# Patient Record
Sex: MALE | Race: BLACK OR AFRICAN AMERICAN | Employment: UNEMPLOYED | RURAL
[De-identification: names, ages, dates, MRNs, and addresses within clinical notes are randomized per-mention and may not be internally consistent; named-entity substitution may affect disease eponyms.]

---
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
## 2021-05-07 ENCOUNTER — OFFICE VISIT (OUTPATIENT)
Dept: FAMILY MEDICINE CLINIC | Age: 21
End: 2021-05-07

## 2021-05-07 VITALS
SYSTOLIC BLOOD PRESSURE: 115 MMHG | RESPIRATION RATE: 20 BRPM | OXYGEN SATURATION: 100 % | HEART RATE: 78 BPM | TEMPERATURE: 98.7 F | WEIGHT: 131.2 LBS | HEIGHT: 69 IN | DIASTOLIC BLOOD PRESSURE: 70 MMHG | BODY MASS INDEX: 19.43 KG/M2

## 2021-05-07 DIAGNOSIS — Z02.1 PRE-EMPLOYMENT HEALTH SCREENING EXAMINATION: Primary | ICD-10-CM

## 2021-05-07 DIAGNOSIS — R31.21 ASYMPTOMATIC MICROSCOPIC HEMATURIA: ICD-10-CM

## 2021-05-07 NOTE — PROGRESS NOTES
Chief Complaint   Patient presents with    Employment Physical     DOT       3 most recent PHQ Screens 5/7/2021   Little interest or pleasure in doing things Not at all   Feeling down, depressed, irritable, or hopeless Not at all   Total Score PHQ 2 0   In the past year have you felt depressed or sad most days, even if you felt okay? -   Has there been a time in the past month when you have had serious thoughts about ending your life? -   Have you ever in your whole life, tried to kill yourself or made a suicide attempt? -     Learning Assessment 5/7/2021   PRIMARY LEARNER Patient   HIGHEST LEVEL OF EDUCATION - PRIMARY LEARNER  -   BARRIERS PRIMARY LEARNER -   Alexa 149 -   ANSWERED BY patient   RELATIONSHIP SELF     No flowsheet data found. Abuse Screening Questionnaire 5/7/2021   Do you ever feel afraid of your partner? N   Are you in a relationship with someone who physically or mentally threatens you? N   Is it safe for you to go home? Y     ADL Assessment 5/7/2021   Feeding yourself No Help Needed   Getting from bed to chair No Help Needed   Getting dressed No Help Needed   Bathing or showering No Help Needed   Walk across the room (includes cane/walker) No Help Needed   Using the telphone No Help Needed   Taking your medications No Help Needed   Preparing meals No Help Needed   Managing money (expenses/bills) No Help Needed   Moderately strenuous housework (laundry) No Help Needed   Shopping for personal items (toiletries/medicines) No Help Needed   Shopping for groceries No Help Needed   Driving No Help Needed   Climbing a flight of stairs No Help Needed   Getting to places beyond walking distances No Help Needed     1.  Have you been to the ER, urgent care clinic since your last visit? Hospitalized since your last visit? No    2. Have you seen or consulted any other health care providers outside of the [address] [place] [address] since your last visit? Include any pap smears or colon screening. No      Chief Complaint   Patient presents with    Employment Physical     DOT         Visit Vitals  /70 (BP 1 Location: Left arm, BP Patient Position: Sitting, BP Cuff Size: Adult long)   Pulse 78   Temp 98.7 °F (37.1 °C) (Temporal)   Resp 20   Ht 5' 9\" (1.753 m)   Wt 131 lb 3.2 oz (59.5 kg)   SpO2 100%   BMI 19.37 kg/m²       Pain Scale: 0 - No pain/10  Pain Location:    Visual Acuity Screening    Right eye Left eye Both eyes   Without correction: 20/20 20/20 20/20   With correction:      Comments: Red is red and green is green. Archie Clifford is a 24 y.o. male presenting for/with:    No chief complaint on file. Symptom review:    NO  Fever   NO  Shaking chills  NO  Cough  NO  Body aches  NO  Coughing up blood  NO  Chest congestion  NO  Chest pain  NO  Shortness of breath  NO  Profound Loss of smell/taste  NO  Nausea/Vomiting   NO  Loose stool/Diarrhea  NO  any skin issues    Patient Risk Factors Reviewed as follows:  NO  have you been in Close contact with confirmed COVID19 patient   NO  History of recent travel to affected geographical areas within the past 14 days  NO  COPD  NO  Active Cancer/Leukemia/Lymphoma/Chemotherapy  NO  Oral steroid use  NO  Pregnant  NO  Diabetes Mellitus  NO  Heart disease  NO  Asthma  NO Health care worker at home  NO Health care worker  NO Is there a Pregnant Woman in the home  NO Dialysis pt in the home   NO a large number of people living in the home      This encounter was created in error - please disregard.
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
## 2021-05-07 NOTE — PROGRESS NOTES
POC Urine    Clear  Yellow  Glucose - negative  Bilirubin - negative  Ketones- negative  SG- 1.010  Blood- Small  PH- 7.0  Pro- Negative  Uro- 0.2  Nit- Negative  Roxy- Negative

## 2021-05-23 ENCOUNTER — APPOINTMENT (OUTPATIENT)
Dept: GENERAL RADIOLOGY | Age: 21
DRG: 754 | End: 2021-05-23
Attending: FAMILY MEDICINE
Payer: MEDICAID

## 2021-05-23 ENCOUNTER — HOSPITAL ENCOUNTER (INPATIENT)
Age: 21
LOS: 2 days | Discharge: HOME | DRG: 754 | End: 2021-05-25
Attending: FAMILY MEDICINE | Admitting: PSYCHIATRY & NEUROLOGY
Payer: MEDICAID

## 2021-05-23 DIAGNOSIS — R45.851 DEPRESSION WITH SUICIDAL IDEATION: Primary | ICD-10-CM

## 2021-05-23 DIAGNOSIS — F32.A DEPRESSION WITH SUICIDAL IDEATION: Primary | ICD-10-CM

## 2021-05-23 DIAGNOSIS — R07.89 CHEST WALL PAIN: ICD-10-CM

## 2021-05-23 PROBLEM — F32.9 MAJOR DEPRESSION: Status: RESOLVED | Noted: 2021-05-23 | Resolved: 2021-05-23

## 2021-05-23 PROBLEM — F32.2 MAJOR DEPRESSIVE DISORDER, SINGLE EPISODE, SEVERE (HCC): Status: ACTIVE | Noted: 2021-05-23

## 2021-05-23 PROBLEM — F32.9 MAJOR DEPRESSION: Status: ACTIVE | Noted: 2021-05-23

## 2021-05-23 PROBLEM — F32.9 MAJOR DEPRESSIVE EPISODE: Status: ACTIVE | Noted: 2021-05-23

## 2021-05-23 LAB
ALBUMIN SERPL-MCNC: 4.3 G/DL (ref 3.5–5)
ALBUMIN/GLOB SERPL: 1.2 {RATIO} (ref 1.1–2.2)
ALP SERPL-CCNC: 61 U/L (ref 45–117)
ALT SERPL-CCNC: 19 U/L (ref 12–78)
AMPHET UR QL SCN: NEGATIVE
ANION GAP SERPL CALC-SCNC: 16 MMOL/L (ref 5–15)
APAP SERPL-MCNC: <2 UG/ML (ref 10–30)
APPEARANCE UR: CLEAR
AST SERPL-CCNC: 28 U/L (ref 15–37)
BACTERIA URNS QL MICRO: NEGATIVE /HPF
BARBITURATES UR QL SCN: NEGATIVE
BASOPHILS # BLD: 0 K/UL (ref 0–0.1)
BASOPHILS NFR BLD: 1 % (ref 0–1)
BENZODIAZ UR QL: NEGATIVE
BILIRUB SERPL-MCNC: 0.4 MG/DL (ref 0.2–1)
BILIRUB UR QL: NEGATIVE
BUN SERPL-MCNC: 13 MG/DL (ref 6–20)
BUN/CREAT SERPL: 12 (ref 12–20)
CALCIUM SERPL-MCNC: 9.7 MG/DL (ref 8.5–10.1)
CANNABINOIDS UR QL SCN: POSITIVE
CHLORIDE SERPL-SCNC: 105 MMOL/L (ref 97–108)
CK MB CFR SERPL CALC: 0.2 % (ref 0–2.5)
CK MB SERPL-MCNC: 3.1 NG/ML (ref 5–25)
CK SERPL-CCNC: 1585 U/L (ref 39–308)
CK SERPL-CCNC: 1628 U/L (ref 39–308)
CO2 SERPL-SCNC: 24 MMOL/L (ref 21–32)
COCAINE UR QL SCN: NEGATIVE
COLOR UR: ABNORMAL
CREAT SERPL-MCNC: 1.1 MG/DL (ref 0.7–1.3)
DIFFERENTIAL METHOD BLD: NORMAL
DRUG SCRN COMMENT,DRGCM: ABNORMAL
EOSINOPHIL # BLD: 0 K/UL (ref 0–0.4)
EOSINOPHIL NFR BLD: 0 % (ref 0–7)
EPITH CASTS URNS QL MICRO: NORMAL /LPF
ERYTHROCYTE [DISTWIDTH] IN BLOOD BY AUTOMATED COUNT: 13 % (ref 11.5–14.5)
ETHANOL SERPL-MCNC: 40 MG/DL
ETHANOL SERPL-MCNC: 71 MG/DL
FLUAV RNA SPEC QL NAA+PROBE: NOT DETECTED
FLUBV RNA SPEC QL NAA+PROBE: NOT DETECTED
GLOBULIN SER CALC-MCNC: 3.6 G/DL (ref 2–4)
GLUCOSE SERPL-MCNC: 93 MG/DL (ref 65–100)
GLUCOSE UR STRIP.AUTO-MCNC: NEGATIVE MG/DL
HCT VFR BLD AUTO: 41.1 % (ref 36.6–50.3)
HGB BLD-MCNC: 14.1 G/DL (ref 12.1–17)
HGB UR QL STRIP: ABNORMAL
IMM GRANULOCYTES # BLD AUTO: 0 K/UL (ref 0–0.04)
IMM GRANULOCYTES NFR BLD AUTO: 0 % (ref 0–0.5)
KETONES UR QL STRIP.AUTO: NEGATIVE MG/DL
LEUKOCYTE ESTERASE UR QL STRIP.AUTO: NEGATIVE
LYMPHOCYTES # BLD: 2.5 K/UL (ref 0.8–3.5)
LYMPHOCYTES NFR BLD: 37 % (ref 12–49)
MAGNESIUM SERPL-MCNC: 1.9 MG/DL (ref 1.6–2.4)
MCH RBC QN AUTO: 29.7 PG (ref 26–34)
MCHC RBC AUTO-ENTMCNC: 34.3 G/DL (ref 30–36.5)
MCV RBC AUTO: 86.5 FL (ref 80–99)
METHADONE UR QL: NEGATIVE
MONOCYTES # BLD: 0.3 K/UL (ref 0–1)
MONOCYTES NFR BLD: 5 % (ref 5–13)
MUCOUS THREADS URNS QL MICRO: NORMAL /LPF
NEUTS SEG # BLD: 3.9 K/UL (ref 1.8–8)
NEUTS SEG NFR BLD: 57 % (ref 32–75)
NITRITE UR QL STRIP.AUTO: NEGATIVE
NRBC # BLD: 0 K/UL (ref 0–0.01)
NRBC BLD-RTO: 0 PER 100 WBC
OPIATES UR QL: NEGATIVE
PCP UR QL: NEGATIVE
PH UR STRIP: 6 [PH] (ref 5–8)
PLATELET # BLD AUTO: 218 K/UL (ref 150–400)
PMV BLD AUTO: 10.5 FL (ref 8.9–12.9)
POTASSIUM SERPL-SCNC: 3.3 MMOL/L (ref 3.5–5.1)
PROT SERPL-MCNC: 7.9 G/DL (ref 6.4–8.2)
PROT UR STRIP-MCNC: NEGATIVE MG/DL
RBC # BLD AUTO: 4.75 M/UL (ref 4.1–5.7)
RBC #/AREA URNS HPF: NORMAL /HPF (ref 0–5)
SALICYLATES SERPL-MCNC: <1.7 MG/DL (ref 2.8–20)
SARS-COV-2, COV2: NOT DETECTED
SODIUM SERPL-SCNC: 145 MMOL/L (ref 136–145)
SP GR UR REFRACTOMETRY: 1.02 (ref 1–1.03)
TROPONIN I SERPL-MCNC: <0.05 NG/ML
TROPONIN I SERPL-MCNC: <0.05 NG/ML
TSH SERPL DL<=0.05 MIU/L-ACNC: 1.26 UIU/ML (ref 0.36–3.74)
UROBILINOGEN UR QL STRIP.AUTO: 0.2 EU/DL (ref 0.2–1)
WBC # BLD AUTO: 6.8 K/UL (ref 4.1–11.1)
WBC URNS QL MICRO: NORMAL /HPF (ref 0–4)

## 2021-05-23 PROCEDURE — 74011250637 HC RX REV CODE- 250/637: Performed by: PSYCHIATRY & NEUROLOGY

## 2021-05-23 PROCEDURE — 96361 HYDRATE IV INFUSION ADD-ON: CPT

## 2021-05-23 PROCEDURE — 84484 ASSAY OF TROPONIN QUANT: CPT

## 2021-05-23 PROCEDURE — 82553 CREATINE MB FRACTION: CPT

## 2021-05-23 PROCEDURE — 65220000003 HC RM SEMIPRIVATE PSYCH

## 2021-05-23 PROCEDURE — 82550 ASSAY OF CK (CPK): CPT

## 2021-05-23 PROCEDURE — 80179 DRUG ASSAY SALICYLATE: CPT

## 2021-05-23 PROCEDURE — 80053 COMPREHEN METABOLIC PANEL: CPT

## 2021-05-23 PROCEDURE — 93005 ELECTROCARDIOGRAM TRACING: CPT

## 2021-05-23 PROCEDURE — 36415 COLL VENOUS BLD VENIPUNCTURE: CPT

## 2021-05-23 PROCEDURE — 82077 ASSAY SPEC XCP UR&BREATH IA: CPT

## 2021-05-23 PROCEDURE — 81001 URINALYSIS AUTO W/SCOPE: CPT

## 2021-05-23 PROCEDURE — 84443 ASSAY THYROID STIM HORMONE: CPT

## 2021-05-23 PROCEDURE — 99285 EMERGENCY DEPT VISIT HI MDM: CPT

## 2021-05-23 PROCEDURE — 74011250636 HC RX REV CODE- 250/636: Performed by: EMERGENCY MEDICINE

## 2021-05-23 PROCEDURE — 83735 ASSAY OF MAGNESIUM: CPT

## 2021-05-23 PROCEDURE — 80143 DRUG ASSAY ACETAMINOPHEN: CPT

## 2021-05-23 PROCEDURE — 87636 SARSCOV2 & INF A&B AMP PRB: CPT

## 2021-05-23 PROCEDURE — 85025 COMPLETE CBC W/AUTO DIFF WBC: CPT

## 2021-05-23 PROCEDURE — 71045 X-RAY EXAM CHEST 1 VIEW: CPT

## 2021-05-23 PROCEDURE — 80307 DRUG TEST PRSMV CHEM ANLYZR: CPT

## 2021-05-23 PROCEDURE — 96374 THER/PROPH/DIAG INJ IV PUSH: CPT

## 2021-05-23 RX ORDER — MAG HYDROX/ALUMINUM HYD/SIMETH 200-200-20
30 SUSPENSION, ORAL (FINAL DOSE FORM) ORAL
Status: DISCONTINUED | OUTPATIENT
Start: 2021-05-23 | End: 2021-05-25 | Stop reason: HOSPADM

## 2021-05-23 RX ORDER — LORAZEPAM 1 MG/1
1 TABLET ORAL
Status: DISCONTINUED | OUTPATIENT
Start: 2021-05-23 | End: 2021-05-24

## 2021-05-23 RX ORDER — SODIUM CHLORIDE 0.9 % (FLUSH) 0.9 %
5-10 SYRINGE (ML) INJECTION ONCE
Status: DISCONTINUED | OUTPATIENT
Start: 2021-05-23 | End: 2021-05-23

## 2021-05-23 RX ORDER — LORAZEPAM 2 MG/ML
2 INJECTION INTRAMUSCULAR
Status: DISCONTINUED | OUTPATIENT
Start: 2021-05-23 | End: 2021-05-24

## 2021-05-23 RX ORDER — HYDROXYZINE PAMOATE 50 MG/1
50 CAPSULE ORAL
Status: DISCONTINUED | OUTPATIENT
Start: 2021-05-23 | End: 2021-05-25 | Stop reason: HOSPADM

## 2021-05-23 RX ORDER — KETOROLAC TROMETHAMINE 30 MG/ML
15 INJECTION, SOLUTION INTRAMUSCULAR; INTRAVENOUS
Status: COMPLETED | OUTPATIENT
Start: 2021-05-23 | End: 2021-05-23

## 2021-05-23 RX ORDER — TRAZODONE HYDROCHLORIDE 50 MG/1
50 TABLET ORAL
Status: DISCONTINUED | OUTPATIENT
Start: 2021-05-23 | End: 2021-05-24

## 2021-05-23 RX ORDER — ACETAMINOPHEN 325 MG/1
650 TABLET ORAL
Status: DISCONTINUED | OUTPATIENT
Start: 2021-05-23 | End: 2021-05-25 | Stop reason: HOSPADM

## 2021-05-23 RX ORDER — IBUPROFEN 600 MG/1
600 TABLET ORAL
Status: DISCONTINUED | OUTPATIENT
Start: 2021-05-23 | End: 2021-05-25 | Stop reason: HOSPADM

## 2021-05-23 RX ORDER — ADHESIVE BANDAGE
30 BANDAGE TOPICAL DAILY PRN
Status: DISCONTINUED | OUTPATIENT
Start: 2021-05-23 | End: 2021-05-25 | Stop reason: HOSPADM

## 2021-05-23 RX ADMIN — HYDROXYZINE PAMOATE 50 MG: 50 CAPSULE ORAL at 14:10

## 2021-05-23 RX ADMIN — IBUPROFEN 600 MG: 600 TABLET ORAL at 17:41

## 2021-05-23 RX ADMIN — KETOROLAC TROMETHAMINE 15 MG: 30 INJECTION, SOLUTION INTRAMUSCULAR at 08:03

## 2021-05-23 RX ADMIN — SODIUM CHLORIDE 1000 ML: 9 INJECTION, SOLUTION INTRAVENOUS at 09:19

## 2021-05-23 RX ADMIN — TRAZODONE HYDROCHLORIDE 50 MG: 50 TABLET ORAL at 21:14

## 2021-05-23 NOTE — ED NOTES
[facility] from [facility] was called for a consult. Computer was was placed at bedside.

## 2021-05-23 NOTE — ED NOTES
Per [place] at [facility], Dr. Willard Arellano has not accepted pt and asked that pt be transferred to higher level of care.  Dr. Vielka Jon speaking with Dr. Willard Arellano at  This time
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
## 2021-05-23 NOTE — BH NOTES
Report received from Nia Diaz RN re:  ANABELLAAR at [id number] . Admitted via  to unit as voluntary pt, accompanied by security at 1325 . Alert and oriented x 3. Affect tearful; Mood depressed/anxious. Smoking and alcohol  danger situations, triggers, coping mechanisms, resources discussed. Does  not hold a professional license in [address] [facility] [address]. Raul Vences notified of arrival, along with RNS. Treatment will focus on jose carlos for safety, medication and group activities to better manage anxiety/depression. Placed on q 15 min safety checks and fall prevention. Tired; cooperative. Broke into tears when father called. Has had a falling out with his mother since a family member wasn't allowed to attend his graduation. \"I don't think I'll have a place to go when I leave here\" (lives with mother). Ate all of meal and took a nap.     PRN Medication Documentation    Specific patient behavior that led to need for PRN medication:anxiety mild tearful  Staff interventions attempted prior to PRN being given: talk with pt, reassure  PRN medication given: Vistaril 50mg po given at 1410  Patient response/effectiveness of PRN medication: sleeping one hour later with reg resp    PRN Medication Documentation    Specific patient behavior that led to need for PRN medication:Chest pain mid anterior 7/10,   Staff interventions attempted prior to PRN being given: assess pt, no sob, nausea, hurts more with deep breathing, lung sounds clear  PRN medication given: Motrin 600mg po given at 1741  Patient response/effectiveness of PRN medication: pain 3/10 at 1828; resting

## 2021-05-23 NOTE — ED TRIAGE NOTES
Patient brought in by EMS alcohol intoxication, patient is emotional expressing family issues. Pain denies pain. Patient states his emotional state is due to the fact that his mother did not come to his graduation.

## 2021-05-23 NOTE — MASTER TREATMENT PLAN
[address] [facility] 60 Master Treatment Plan for Marimar Litter Date Treatment Plan Initiated:05/23/21 Treatment Plan Modalities: 
Type of Modality Amount (x minutes) Frequency (x/week) Duration (x days) Name of Responsible Staff Community & wrap-up meetings to encourage peer interactions 30 14 7 Ranjit FLETCHER, CNA/MHT Gibson Vallejo., MHT Group psychotherapy to assist in building coping skills and internal controls [id number] Linda Sanchez, [facility] Regino Espinoza., [facility] Therapeutic activity groups to build coping skills 60 7 7 Abram Amaya., MHT Psychoeducation in group setting to address:  
Medication education Coping Skills Symptom Management [id number] Linda Sanchez, [facility] Regino Espinoza., [facility] Janiya Diane., RN Alfredo Ontiveros RN Discharge planning [id number] [place] [address] Spirituality 60 2 1 Isabelle Guadarrama.  
Physician medication management 15 7 7 MD JOHNY Sheppard. Luzma Lynn MD  
      
      
 
      
 
      
 
 
Treatment Team Signatures I have participated in the development of this plan of treatment and agree to its implementation. Patient Signature Patient Printed Name Date/Time Social Work/Therapist Signature Social Work/Therapist Printed Name Date/Time RN Signature RN Printed Name Kimberly Ojeda RN Date/Time 05/23/21 Other Signature Other Signature Date/Time MD Signature MD Printed Name Date/Time
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
## 2021-05-23 NOTE — ED NOTES
Writer was drawing blood for repeat ETOH, patient requested that he does not have to go home, \"I don't want to be there please can I stay here, they broke me, they hurt my feelings\" Physician ask patient if he wanted to kill himself, patient answers yes, I don't want to go home\". Patient was moved to bed one and a safe room was prepared.

## 2021-05-24 PROBLEM — F43.21 ADJUSTMENT DISORDER WITH DEPRESSED MOOD: Status: ACTIVE | Noted: 2021-05-24

## 2021-05-24 PROBLEM — F32.2 MAJOR DEPRESSIVE DISORDER, SINGLE EPISODE, SEVERE (HCC): Status: RESOLVED | Noted: 2021-05-23 | Resolved: 2021-05-24

## 2021-05-24 LAB
CHOLEST SERPL-MCNC: 124 MG/DL
COMMENT, HOLDF: NORMAL
HDLC SERPL-MCNC: 61 MG/DL
HDLC SERPL: 2 {RATIO} (ref 0–5)
LDLC SERPL CALC-MCNC: 54.4 MG/DL (ref 0–100)
SAMPLES BEING HELD,HOLD: NORMAL
TRIGL SERPL-MCNC: 43 MG/DL (ref ?–150)
VLDLC SERPL CALC-MCNC: 8.6 MG/DL

## 2021-05-24 PROCEDURE — 74011250637 HC RX REV CODE- 250/637: Performed by: PSYCHIATRY & NEUROLOGY

## 2021-05-24 PROCEDURE — 36415 COLL VENOUS BLD VENIPUNCTURE: CPT

## 2021-05-24 PROCEDURE — 80061 LIPID PANEL: CPT

## 2021-05-24 PROCEDURE — 90791 PSYCH DIAGNOSTIC EVALUATION: CPT | Performed by: PSYCHIATRY & NEUROLOGY

## 2021-05-24 PROCEDURE — 65220000003 HC RM SEMIPRIVATE PSYCH

## 2021-05-24 RX ORDER — ALBUTEROL SULFATE 0.83 MG/ML
2.5 SOLUTION RESPIRATORY (INHALATION)
Status: DISCONTINUED | OUTPATIENT
Start: 2021-05-24 | End: 2021-05-25 | Stop reason: HOSPADM

## 2021-05-24 RX ORDER — TRAZODONE HYDROCHLORIDE 50 MG/1
25 TABLET ORAL
Status: DISCONTINUED | OUTPATIENT
Start: 2021-05-24 | End: 2021-05-25 | Stop reason: HOSPADM

## 2021-05-24 RX ADMIN — TRAZODONE HYDROCHLORIDE 25 MG: 50 TABLET ORAL at 21:10

## 2021-05-24 RX ADMIN — HYDROXYZINE PAMOATE 50 MG: 50 CAPSULE ORAL at 15:50

## 2021-05-24 RX ADMIN — MAGNESIUM HYDROXIDE/ALUMINUM HYDROXICE/SIMETHICONE 30 ML: 120; 1200; 1200 SUSPENSION ORAL at 11:29

## 2021-05-24 NOTE — GROUP NOTE
DOMINIQUE  GROUP DOCUMENTATION INDIVIDUAL Group Therapy Note Date: 5/24/2021 Group Start Time: 1000 Group End Time: 3184 Group Topic: [employer] [address] 
 
[facility] GROUP DOCUMENTATION GROUP Group Therapy Note Attendees: 3 Attendance: Attended Patient's Goal:  Go home Interventions/techniques: Supported Follows Directions: Followed directions Interactions: Interacted appropriately Mental Status: Anxious, Calm and Preoccupied Behavior/appearance: Cooperative and Motivated Goals Achieved: Able to engage in interactions, Able to listen to others and Able to give feedback to another Additional Notes:  Addis Mas was out for the Group meeting today. He said that he is having a good day,ate good, and slept good last night. He is having some anxiety at the level of a 7 but no depression. He is excited that the doctor said he could go home tomorrow but seems a little preoccupied. He has no thoughts of hurting himself nor anyone else and is in no pain at this time. Sai Figueroa CNA

## 2021-05-24 NOTE — BH NOTES
Affect blunted, mood depressed. Attended and participated in group. Ate snacks. Interacting appropriately with staff and peers. Denied SI/HI/AVH/pain. Played Bingo with staff and peers. Medication compliant. Pleasant and co-operative. PRN Medication Documentation    Specific patient behavior that led to need for PRN medication: C/o insomnia. Staff interventions attempted prior to PRN being given: Low stimulation prior to bedtime. PRN medication given: Trazodone 50 mg PO at 2114. Patient response/effectiveness of PRN medication: Currently resting in bed. Trazodone was effective. Pt rested quietly in bed with eyes closed for 8.25 hours. Respirations even and unlabored. Pt in no apparent distress.

## 2021-05-24 NOTE — GROUP NOTE
[facility] GROUP DOCUMENTATION INDIVIDUAL Group Therapy Note Date: 5/24/2021 Group Start Time: 9872 Group End Time: 6372 Group Topic: Nursing [address]  GROUP DOCUMENTATION GROUP Group Therapy Note Attendees: 6 Attendance: Attended Patient's Goal:  Patient will be compliant to medication regimen daily. Interventions/techniques: Provide feedback and Reinforced Follows Directions: Followed directions Interactions: Interacted appropriately Mental Status: Blunted and Calm Behavior/appearance: Attentive, Cooperative and Needed prompting Goals Achieved: Able to engage in interactions, Able to listen to others, Able to give feedback to another, Able to receive feedback and Identified medication adherence strategies Additional Notes:  Patient attended and engaged in today's Nursing Medication Education Group with prompting. Patient provided handouts.  
 
Wai Loera RN

## 2021-05-24 NOTE — ROUTINE PROCESS
Shift change report given to Salinas Anthony RN, re: SBAR, medications, and behavior. Jimbo Fall Risk Score - 1.
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
## 2021-05-24 NOTE — GROUP NOTE
DOMINIQUE  GROUP DOCUMENTATION INDIVIDUAL Group Therapy Note Date: 5/24/2021 Group Start Time: 1300 Group End Time: 3939 Group Topic: Process Group - Inpatient [address] OP Macy [facility] GROUP DOCUMENTATION GROUP Group Therapy Note Focus of session was on positive self talk when feeling anger. We spoke about how we can develop the skill of helping ourselves out of moments of anger that have a tendency to then impact your day and your functioning and well being. We spoke about the usual self talk that goes with anger and how it can make things worse and asked group members to share their anger thoughts that are typical for them. I shared some ideas for self talk of managing anger such as I don't need to prove myself in this situation or as long as I keep my cool, I am in control or people are going to act the way they want to, not the way I want them to.   We spoke about how these ideas can better sooth our anger and help us refocus on healthy self care. Attendance: Attended Patient's Goal 
  
Patient will identify 2 feelings or symptoms that might indicate a crisis is beginning to develop :   
 
Interventions/techniques: Informed, Validated, Promoted peer support, Provide feedback, Reinforced and Supported Follows Directions: Followed directions Interactions: Interacted appropriately Mental Status: Anxious, Calm and Congruent Behavior/appearance: Attentive, Cooperative and Needed prompting Goals Achieved: Able to engage in interactions, Able to listen to others, Able to reflect/comment on own behavior, Able to receive feedback, Able to self-disclose, Discussed coping, Discussed discharge plans, Discussed self-esteem issues, Discussed safety plan, Identified feelings, Identified triggers, Identified relapse prevention strategies, Identified medication adherence strategies and Identified resources and support systems Additional Notes:  Pt participated in the group activity. This is the pt's first hospitalization. He is still trying to sort out his feelings of confusion regarding his behavior and subsequent hospitalization Saturday night. He stated this is not his normal behavior. He knows the substance use contributed but he also does not like aggressive behavior and is embarrassed by his on this occasion. We discussed coping strategies and follow up counseling for issues related to his family [employer]

## 2021-05-24 NOTE — PROGRESS NOTES
Problem: Depressed Mood (Adult/Pediatric)    Goal: *STG: Attends activities and groups    Affect blunted, mood depressed. Attended and participated in group. Denied SI or intent to harm self. Assessed/monitored potential harm to self. Encouraged sharing of feelings. Monitored medication compliance and effectiveness. Offered encouragement, reassurance, and support.     Outcome: Progressing Towards Goal

## 2021-05-24 NOTE — BH NOTES
Affect blunted/restricted, mood depressed/anxious. Patient alert & oriented x 4. Patient speech clear and coherent. Patient answers questions appropriately. Patient denies SI/HI/AVH/pain. No delusional statements made. Patient is compliant with medications. Patient appetite good eats 100% of all meals plus snacks. Patient went outside with staff and peers for fresh air break. Patient attended and engaged in today's groups with prompting. Patient is wearing his face mask and adhering to social distancing protocol. Patient in no apparent distress all vital signs within normal limits. PRN Medication Documentation    Specific patient behavior that led to need for PRN medication: Patient c/o having indigestion. Staff interventions attempted prior to PRN being given: Assessment done. PRN medication given: Mylanta 30 mL po @ 1129 for indigestion. Patient response/effectiveness of PRN medication: Positive effects. PRN Medication Documentation    Specific patient behavior that led to need for PRN medication: Patient said that he felt anxious after talking to his family on the phone and requested PRN Vistaril for anxiety. Staff interventions attempted prior to PRN being given: Assessment done. PRN medication given: Vistaril 50 mg po given @ 1550 for anxiety. Patient response/effectiveness of PRN medication: Positive effects.

## 2021-05-24 NOTE — GROUP NOTE
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 Interventions/techniques: Supported Follows Directions: Followed directions Interactions: Interacted appropriately Mental Status: Calm Behavior/appearance: Motivated Goals Achieved: Able to engage in interactions Additional Notes:  He has depression at a 5 because he feels like he is messing everything up. He has no anxiety or pain at this time. Azra Gray CNA

## 2021-05-24 NOTE — BH NOTES
Shift change report given to [facility] [place] GUTIERREZ re: SBAR, medications, and behavior.   Jimbo fall risk score: (1)

## 2021-05-24 NOTE — ROUTINE PROCESS
Shift change report given to Stephy Raman Rn, re: SBAR. Medications, and behavior. Jimbo Fall Risk Fall (1)

## 2021-05-24 NOTE — PROGRESS NOTES
Problem: Risk of Harm to Self or Others  Goal: STG: Patient will limit number of statements of suicidal ideation or intent per day  Description: Patient will limit number of statements of suicidal ideation or intent per day. Indicate number of statements/day. Outcome: Resolved/Met  Goal: *STG: Patient will express feelings of depression, suicide, or self-harm without acting out  Description: Patient will express feelings of depression, suicide, or self-harm without acting out. Comment on how this will be achieved. Outcome: Resolved/Met  Goal: *STG: Patient will express feelings of anger, assaultiveness or homicide without acting out  Description: Patient will express feelings of anger, assaultiveness or homicide without acting out. Comment on how this will be achieved.   Outcome: Resolved/Met  Goal: *STG: Patient will identify 2 alternative ways to cope with suicidal or self-harm feelings  Outcome: Resolved/Met  Goal: *STG: Patient will identify 2 alternative ways to cope with assaultive/homicidal feelings  Outcome: Resolved/Met  Goal: Risk of harm to self or others interventions  Outcome: Resolved/Met

## 2021-05-24 NOTE — H&P
[facility] HISTORY AND PHYSICAL    Name:  Lacey Browning  MR#:  [id number]  :  2000  ACCOUNT #:  [de-identified]  ADMIT DATE:  2021      [facility]    HISTORY OF PRESENT ILLNESS:  The patient is a 71-year-old single male who with no past psychiatric history, who was admitted voluntarily from the [facility] emergency room due to concerns about acute onset of depressive symptoms and some suicidal thinking. He seemed to vacillate back and forth about having suicidal thoughts and whether he might act on them if he went home, and he told the emergency room staff that he had come close to using gun that he saw in the house to shoot himself (the gun has since been removed). The trigger for this was that he was graduating from high school on the evening of , and his mother did not come to it. He apparently had been living with the mother and they have gotten into some type of small argument, and he states that she gave her ticket to another family member who showed up. This apparently triggered a great deal of sadness and anger on the part of the patient to the point where he started having suicidal thoughts. It sounds as though he may have acted out a bit dramatically at home, but the specifics are not entirely clear, but in any event when he was in the emergency room, he did not seem to back away from the fact that he had some suicidal thoughts. It should be noted that he has never had those thoughts before by his report and he states he has never made any attempts to try to harm himself at any point in the past.    He was very tearful and dysphoric, and would get suddenly very tearful when talking to a family member on the phone, both in the emergency room and when he first was admitted.   He was noted to be very soft spoken, have feelings of low self-esteem, and feel helpless and hopeless at times on a long-standing basis due to that low self-esteem. This morning, he states he feels quite a bit better and he is basically back to his baseline. That appears to be the case objectively as he seems to show a full affective range and he states he is not depressed or dysphoric at all. He does, however, admit to having the symptoms following his gradual graduation on Saturday as well as being in the emergency room yesterday morning. He states that he does not think he would act on those thoughts of harming himself that he had before, although we discussed ways to cope with them should they arise in the future. It should also be noted that he had some alcohol in the system when he was admitted (blood level 71) and that could have been contributing to some of his emotional instability as well. He denies being a regular or heavy drinker, however. He does admit to smoking THC daily, and his urine did test positive for that substance. PAST PSYCHIATRIC HISTORY:  None whatsoever including no medication trials. PAST MEDICAL HISTORY:  1. Right wrist fracture. 2.  Right meniscus tear in his knee. 3.  History of episodic asthma during childhood. MEDICATIONS ON ADMISSION:  None. ALLERGIES:  NO KNOWN ALLERGIES. FAMILY HISTORY:  He denies any family psychiatric history that he is aware of. SOCIAL HISTORY:  He is single, has never been , and has no children. He just graduated from high school. He was living with his mother up until that day, but he states he moved out and into living with his maternal grandmother, who is supportive. He states he has a lot of support in part of his father and other family members as well. He is currently not working, but he has been applying for jobs. MENTAL STATUS EXAMINATION:  The patient was noted to be a casually dressed, adequately groomed 80-year-old, who appeared his stated age. He was pleasant and cooperative with the interview and seemed to show a full affective range.   He was smiling at times and did not appear to be overtly depressed. He does admit to being quite depressed for 1-2 days prior to admission, as noted above. He denies any feelings of hopelessness currently or any suicidal thoughts, but he states he has had strong hopelessness feelings off and on for sometime. There was no evidence or history to suggest any ham nor any evidence or history of psychosis such as hallucinations or delusional thinking. His judgment and insight are obviously poor and impulsive, and his cognitive functioning shows a slightly below average fund of knowledge noted, but with no overt or obvious deficits evident. DIAGNOSTIC IMPRESSION:  AXIS I:  Adjustment disorder with depressed mood (F43.21). AXIS II:  Deferred - -possible borderline intellectual functioning and/or cluster B personality traits. AXIS III:  None. AXIS IV:  Stressors are moderate (conflict with mother). AXIS V:  Global Assessment of Functioning currently is 72, but on admission was 45. PLAN:  1. We will admit the patient for further supportive treatment, close observation, increased structure, and involvement within the groups and milieu. 2.  At this point, we will only use p.r.n. Vistaril and trazodone, as he felt that both of them were fairly helpful in their use yesterday. We will decrease the trazodone slightly to 25 mg as he felt little tired this morning. 3.  Estimated length of stay would likely only be on the order of another 1-2 days. Likely, follow up would be with the [facility] or possibly just his primary care physician as he did not want to commit to any therapy. I certify that this patient's inpatient psychiatric hospital admission is medically necessary for treatment, which could reasonably be expected to improve his condition or for diagnostic study. The inpatient psychiatric services are provided while he is under the care of a physician and are included in the individualized plan of care.       CUBA LOPES MD JYOTI [email or web address]  D:  05/24/2021 11:00  T:  05/24/2021 11:49  JOB #:  [id number]

## 2021-05-24 NOTE — BH NOTES
Pt attended recreational group and was an active participant. Today's group was playing [employer]. Pt was attentive and cooperative during group.

## 2021-05-25 VITALS
RESPIRATION RATE: 17 BRPM | OXYGEN SATURATION: 100 % | DIASTOLIC BLOOD PRESSURE: 87 MMHG | WEIGHT: 116 LBS | TEMPERATURE: 97.7 F | BODY MASS INDEX: 17.18 KG/M2 | HEIGHT: 69 IN | HEART RATE: 73 BPM | SYSTOLIC BLOOD PRESSURE: 118 MMHG

## 2021-05-25 LAB
ATRIAL RATE: 112 BPM
ATRIAL RATE: 88 BPM
CALCULATED P AXIS, ECG09: 14 DEGREES
CALCULATED P AXIS, ECG09: 63 DEGREES
CALCULATED R AXIS, ECG10: 84 DEGREES
CALCULATED R AXIS, ECG10: 84 DEGREES
CALCULATED T AXIS, ECG11: 51 DEGREES
CALCULATED T AXIS, ECG11: 60 DEGREES
DIAGNOSIS, 93000: NORMAL
DIAGNOSIS, 93000: NORMAL
P-R INTERVAL, ECG05: 130 MS
P-R INTERVAL, ECG05: 142 MS
Q-T INTERVAL, ECG07: 322 MS
Q-T INTERVAL, ECG07: 348 MS
QRS DURATION, ECG06: 78 MS
QRS DURATION, ECG06: 80 MS
QTC CALCULATION (BEZET), ECG08: 421 MS
QTC CALCULATION (BEZET), ECG08: 439 MS
VENTRICULAR RATE, ECG03: 112 BPM
VENTRICULAR RATE, ECG03: 88 BPM

## 2021-05-25 PROCEDURE — 99239 HOSP IP/OBS DSCHRG MGMT >30: CPT | Performed by: PSYCHIATRY & NEUROLOGY

## 2021-05-25 PROCEDURE — 74011250637 HC RX REV CODE- 250/637: Performed by: PSYCHIATRY & NEUROLOGY

## 2021-05-25 RX ORDER — HYDROXYZINE PAMOATE 50 MG/1
50 CAPSULE ORAL
Qty: 30 CAPSULE | Refills: 1 | Status: SHIPPED | OUTPATIENT
Start: 2021-05-25

## 2021-05-25 RX ADMIN — HYDROXYZINE PAMOATE 50 MG: 50 CAPSULE ORAL at 10:03

## 2021-05-25 NOTE — DISCHARGE INSTRUCTIONS
Patient Education        Learning About Depression Screening  What is depression screening? Depression screening is a way to see if you have depression symptoms. It may be done by a doctor or counselor. It's often part of a routine checkup. That's because your mental health is just as important as your physical health. Depression is a medical illness that affects how you feel, think, and act. You may:  · Have less energy. · Lose interest in your daily activities. · Feel sad and grouchy for a long time. Depression is very common. It affects men and women of all ages. Many things can trigger depression. Some people become depressed after they have a stroke or find out they have a major illness like cancer or heart disease. The death of a loved one, a breakup, or changes in the natural brain chemicals may lead to depression. It can run in families. Most experts believe that a combination of inherited genes and stressful life events can cause it. What happens during screening? You may be asked to fill out a form about your depression symptoms. You and the doctor will discuss your answers. The doctor may ask you more questions to learn more about how you think, act, and feel. What happens after screening? If you have signs of depression, your doctor will talk to you about your options. Doctors usually treat depression with medicines or counseling. Often, combining the two works best. Many people don't get help because they think that they'll get over the depression on their own. But people with depression may not get better unless they get treatment. Many people feel embarrassed or ashamed about having depression. But it isn't a sign of personal weakness. It's not a character flaw. A person who is depressed is not \"crazy. \" Depression is caused by changes in the brain. A serious symptom of depression is thinking about death or suicide.  If you or someone you care about talks about this or about feeling hopeless, get help right away. It's important to know that depression can be treated. The first step toward feeling better is often just seeing that the problem exists. Where can you learn more? Go to http://www.gray.com/  Enter T185 in the search box to learn more about \"Learning About Depression Screening. \"  Current as of: September 23, 2020               Content Version: 12.8  © 2006-2021 [employer]. Care instructions adapted under license by [employer] (which disclaims liability or warranty for this information). If you have questions about a medical condition or this instruction, always ask your healthcare professional. [place] [address] any warranty or liability for your use of this information. Patient Education        Learning About Mood Disorders  What are mood disorders? Mood disorders are medical problems that affect how you feel. They can impact your moods, thoughts, and actions. Mood disorders include:  · Depression. This causes you to feel sad or hopeless for much of the time. · Bipolar disorder. This causes extreme mood changes from manic episodes of very high energy to extreme lows of depression. · Seasonal affective disorder (SAD). This is a type of depression that affects you during the same season each year. Most often people experience SAD during the fall and winter months when days are shorter and there is less light. What are the symptoms? Depression  You may:  · Feel sad or hopeless nearly every day. · Lose interest in or not get pleasure from most daily activities. You feel this way nearly every day. · Have low energy, changes in your appetite, or changes in how well you sleep. · Have trouble concentrating. · Think about death and suicide. Keep the numbers for these national suicide hotlines: 7-125-943-TALK (9-940.530.3890) and 0-296-YHZRKCF (6-697.567.1303).  If you or someone you know talks about suicide or feeling hopeless, get help right away. Bipolar disorder  Symptoms depend on your mood swings. You may:  · Feel very happy, energetic, or on edge. · Feel like you need very little sleep. · Feel overly self-confident. · Do impulsive things, such as spending a lot of money. · Feel sad or hopeless. · Have racing thoughts or trouble thinking and making decisions. · Lose interest in things you have enjoyed in the past.  · Think about death and suicide. Keep the numbers for these national suicide hotlines: 7-293-033-TALK (8-139.545.8546) and 3-990-BEZLMZG (6-627.779.1723). If you or someone you know talks about suicide or feeling hopeless, get help right away. Seasonal affective disorder (SAD)  Symptoms come and go at about the same time each year. For most people with SAD, symptoms come during the winter when there is less daylight. You may:  · Feel sad, grumpy, acevedo, or anxious. · Lose interest in your usual activities. · Eat more and crave carbohydrates, such as bread and pasta. · Gain weight. · Sleep more and feel drowsy during the daytime. How are mood disorders treated? Mood disorders can be treated with medicines or counseling, or a combination of both. Medicines for depression and SAD may include antidepressants. Medicines for bipolar disorder may include:  · Mood stabilizers. · Antipsychotics. · Benzodiazepines. Counseling may involve cognitive-behavioral therapy. It teaches you how to change the ways you think and behave. This can help you stop thinking bad thoughts about yourself and your life. Light therapy is the main treatment for SAD. This therapy uses a special kind of lamp. You let the lamp shine on you at certain times, usually in the morning. This may help your symptoms during the months when there is less sunlight. Healthy lifestyle  Healthy lifestyle changes may help you feel better. · Be active often. You might try walking or strength training. · Eat a healthy diet. Include fruits, vegetables, lean proteins, and whole grains in your diet each day. · Keep a regular sleep schedule. Try for 8 hours of sleep a night. · Find ways to manage stress, such as relaxation exercises. · Avoid alcohol and illegal drugs. Follow-up care is a key part of your treatment and safety. Be sure to make and go to all appointments, and call your doctor if you are having problems. It's also a good idea to know your test results and keep a list of the medicines you take. Where can you learn more? Go to http://www.gray.com/  Enter Z126 in the search box to learn more about \"Learning About Mood Disorders. \"  Current as of: September 23, 2020               Content Version: 12.8  © 2006-2021 [email or web address]. Care instructions adapted under license by [employer] (which disclaims liability or warranty for this information). If you have questions about a medical condition or this instruction, always ask your healthcare professional. [place] [address] any warranty or liability for your use of this information. BEHAVIORAL HEALTH NURSING DISCHARGE NOTE      The following personal items collected during your admission are returned to you:   Dental Appliance: Dental Appliances: None  Vision: Visual Aid: None  Hearing Aid:    Jewelry: Jewelry: None  Clothing: Clothing:  (see valuables sheet)  Other Valuables: Other Valuables: Money (comment)  Valuables sent to safe:        PATIENT INSTRUCTIONS:    What to do at Home; You may not drink any alcoholic beverages during the next 48-hours. You may resume normal activities. Avoid making any critical decisions for at least 24-hours. Recommended diet: Regular Diet. Recommended activity: Activity as tolerated. [facility] [phone]. If you have problems relating to your recovery, call your physician.     The discharge information has been reviewed with the patient. The patient verbalized understanding.

## 2021-05-25 NOTE — GROUP NOTE
[facility] GROUP DOCUMENTATION INDIVIDUAL Group Therapy Note Date: 5/25/2021 Group Start Time: 1400 Group End Time: 0471 Group Topic: Process Group - Inpatient [address] OP Estefania Neely 
 
[facility] GROUP DOCUMENTATION GROUP Group Therapy Note Attendees: Focus of session was on motivation and what we need to have in order to meet our goals. We spoke about how there is intrinsic motivation and extrinsic motivation. We spoke about how intrinsic is internal and that the rewards of reaching a goal or doing a positive thing for ourselves or others is the pleasurable feelings we get from doing these things. Extrinsic motivation is the benefit of the behavior that lies outside the feelings that are brought out of the behavior. These motivators can be money or rewards or positive feedback and comments. We spoke about making sure that the goals we have do have an intrinsic motivator because if it is all based on extrinsic factors, then it is easy to not reach our goals. I asked group members to share a current goal and decide what is motivating them to meet that goal. 
 
  
 
Attendance: [facility] GROUP DOC ATTENDANCE:[id number]} Patient's Goal:  *** Interventions/techniques: {[facility] GROUP DOC INTERVENTIONS/TECHNIQUES:09999} Follows Directions: {[facility] FOLLOWS DIRECTION:30283} Interactions: {[facility] INTERACTIONS:09496} Mental Status: {[facility] MENTAL TNZHMD:85463} Behavior/appearance: {[facility] GROUP DOC BEHAVIOR/APPEARANCE:63470} Goals Achieved: {[facility] GROUP DOC GOALS ACHIEVED:23371} Additional Notes:  *** Ramone Germain

## 2021-05-25 NOTE — GROUP NOTE
[facility] GROUP DOCUMENTATION INDIVIDUAL Group Therapy Note Date: 5/24/2021 Group Start Time: 46 Group End Time: 2020 Group Topic: [employer] Laura Morales Fearing [facility] GROUP DOCUMENTATION GROUP Group Therapy Note Attendees: 4 Attendance: Attended Patient's Goal:no goal set Interventions/techniques: Provide feedback Follows Directions: Followed directions Interactions: Interacted appropriately Mental Status: Calm Behavior/appearance: Cooperative Goals Achieved: Able to listen to others Additional Notes:  Patient said he had a good day . Patient said he has no pain, no anxiety ,nodepression ,and no SI . [facility]

## 2021-05-25 NOTE — BH NOTES
Affect blunted, mood appropriate. Pt attended and participated in scheduled group activities. Pt was social and appropriate with staff and peers. Pt denies SI/HI/AVH/pain and stated he was looking forward to discharge tomorrow. Pt ate 100% of snack. Pt has no scheduled medications for evening/night but did request a PRN for sleep. Pt is in no apparent distress at this time and made no delusional statements. Pt rested in his bed with his eyes closed for 9.25 hours.     PRN Medication Documentation    Specific patient behavior that led to need for PRN medication: restless  Staff interventions attempted prior to PRN being given: Pt request  PRN medication given: Trazodone 25 mg PO @ 2110  Patient response/effectiveness of PRN medication: Pt rested

## 2021-05-25 NOTE — GROUP NOTE
DOMINIQUE  GROUP DOCUMENTATION INDIVIDUAL Group Therapy Note Date: 5/25/2021 Group Start Time: 9224 Group End Time: 1100 Group Topic: [employer] 1 Laura Madennise Lopez DOMINIQUE  GROUP DOCUMENTATION GROUP Group Therapy Note Attendees: 7 Attendance: Attended Patient's Goal:  To be discharge soon Interventions/techniques: Provide feedback Follows Directions: Followed directions Interactions: Interacted appropriately Mental Status: Flat Behavior/appearance: Withdrawn/quiet Goals Achieved: Able to listen to others Additional Notes:  Patient appetite is good, no physical pain, no suicidal thoughts. Patient has some depression and anxiety level 2. Berta Bodily

## 2021-05-25 NOTE — SUICIDE SAFETY PLAN
SAFETY PLAN    A suicide Safety Plan is a document that supports someone when they are having thoughts of suicide. Warning Signs that indicate a suicidal crisis may be developing: What (situations, thoughts, feelings, body sensations, behaviors, etc.) do you experience that lets you know you are beginning to think about suicide? 1. Feeling down  2. Start to think why I am here  3. Start to yell and fight    Internal Coping Strategies:  What things can I do (relaxation techniques, hobbies, physical activities, etc.) to take my mind off my problems without contacting another person? 1. fishing  2. walking  3. Playing a game     People and social settings that provide distraction: Who can I call or where can I go to distract me? 1. Name: Musa Mealing  Phone: [phone]  2. Name: Shalom La  Phone: [phone]  3. Place:  park          4. Place: beach    People whom I can ask for help: Who can I call when I need help - for example, friends, family, clergy, someone else? 1. Name: Miky Lucia               Phone: [phone]  2. Name: [facility]  Phone: [phone]  3. Name: Venessa Mina  Phone: [phone]    Professionals or [facility] agencies I can contact during a crisis: Who can I call for help - for example, my doctor, my psychiatrist, my psychologist, a mental health provider, a suicide hotline? 1. Clinician Name: Bev Hassan    Phone:       Clinician Pager or Emergency Contact #:     2. Clinician Name:   Phone:       Clinician Pager or Emergency Contact #:     3. Suicide Prevention Lifeline: 1-448-841-TALK (1943)    4. [address] [place] [address] Emergency Services -  for example, [facility] suicide hotline, [facility]: [phone]      Emergency Services Address: [place] [address]      Emergency Services Phone: [phone]    Making the environment safe: How can I make my environment (house/apartment/living space) safer?  For example, can I remove guns, medications, and other items? 1. Everything has been removed  2.

## 2021-05-25 NOTE — BH NOTES
Discharge Note:    Patient discharged home today. Patient alert & oriented x 4. Patient speech clear and coherent. Patient answers questions appropriately. Patient denies SI/HI/AVH/pain. No delusional statements made. Patient is compliant with medications. Patient given PRN Vistaril 50 mg po @ 1003 for anxiety with positive effects. Patient appetite good ate 100% breakfast plus a snack. Patient in no apparent distress all vital signs within normal limits. No agitation or aggression noted. Rn reviewed discharge orders and instructions with patient which included medication drug name, purpose, doses, administration times, route, and adverse effects. Prescription sent into [facility] in [place]. Patient left the unit @ 1107 ambulatory wearing a face mask and companied by staff. All personal valuables and belongings sent with patient.

## 2021-05-25 NOTE — BH NOTES
Behavioral Health Transition Record to Provider    Patient Name: Fei Olmos  YOB: 2000  Medical Record Number: [id number]  Date of Admission: 5/23/2021  Date of Discharge: 5/25/2021    Attending Provider: Navjot Rubio MD  Discharging Provider: Toño Abernathy  To contact this individual call [phone] and ask the  to page. If unavailable, ask to be transferred to [facility] Provider on call. [facility] Provider will be available on call 24/7 and during holidays. Primary Care Provider: Abhinav Roberts NP    No Known Allergies    Reason for Admission: SI    Admission Diagnosis: Major depressive episode [F32.9]  Major depression [F32.9]    * No surgery found *    Results for orders placed or performed during the hospital encounter of 05/23/21   CBC WITH AUTOMATED DIFF   Result Value Ref Range    WBC 6.8 4.1 - 11.1 K/uL    RBC 4.75 4.10 - 5.70 M/uL    HGB 14.1 12.1 - 17.0 g/dL    HCT 41.1 36.6 - 50.3 %    MCV 86.5 80.0 - 99.0 FL    MCH 29.7 26.0 - 34.0 PG    MCHC 34.3 30.0 - 36.5 g/dL    RDW 13.0 11.5 - 14.5 %    PLATELET 594 377 - 082 K/uL    MPV 10.5 8.9 - 12.9 FL    NRBC 0.0 0  WBC    ABSOLUTE NRBC 0.00 0.00 - 0.01 K/uL    NEUTROPHILS 57 32 - 75 %    LYMPHOCYTES 37 12 - 49 %    MONOCYTES 5 5 - 13 %    EOSINOPHILS 0 0 - 7 %    BASOPHILS 1 0 - 1 %    IMMATURE GRANULOCYTES 0 0.0 - 0.5 %    ABS. NEUTROPHILS 3.9 1.8 - 8.0 K/UL    ABS. LYMPHOCYTES 2.5 0.8 - 3.5 K/UL    ABS. MONOCYTES 0.3 0.0 - 1.0 K/UL    ABS. EOSINOPHILS 0.0 0.0 - 0.4 K/UL    ABS. BASOPHILS 0.0 0.0 - 0.1 K/UL    ABS. IMM.  GRANS. 0.0 0.00 - 0.04 K/UL    DF AUTOMATED     METABOLIC PANEL, COMPREHENSIVE   Result Value Ref Range    Sodium 145 136 - 145 mmol/L    Potassium 3.3 (L) 3.5 - 5.1 mmol/L    Chloride 105 97 - 108 mmol/L    CO2 24 21 - 32 mmol/L    Anion gap 16 (H) 5 - 15 mmol/L    Glucose 93 65 - 100 mg/dL    BUN 13 6 - 20 MG/DL    Creatinine 1.10 0.70 - 1.30 MG/DL    BUN/Creatinine ratio 12 12 - 20      GFR est AA >60 >60 ml/min/1.73m2    GFR est non-AA >60 >60 ml/min/1.73m2    Calcium 9.7 8.5 - 10.1 MG/DL    Bilirubin, total 0.4 0.2 - 1.0 MG/DL    ALT (SGPT) 19 12 - 78 U/L    AST (SGOT) 28 15 - 37 U/L    Alk.  phosphatase 61 45 - 117 U/L    Protein, total 7.9 6.4 - 8.2 g/dL    Albumin 4.3 3.5 - 5.0 g/dL    Globulin 3.6 2.0 - 4.0 g/dL    A-G Ratio 1.2 1.1 - 2.2     ETHYL ALCOHOL   Result Value Ref Range    ALCOHOL(ETHYL),SERUM 71 (H) <10 MG/DL   TSH 3RD GENERATION   Result Value Ref Range    TSH 1.26 0.36 - 3.74 uIU/mL   URINALYSIS W/ RFLX MICROSCOPIC   Result Value Ref Range    Color YELLOW/STRAW      Appearance CLEAR CLEAR      Specific gravity 1.020 1.003 - 1.030      pH (UA) 6.0 5.0 - 8.0      Protein Negative NEG mg/dL    Glucose Negative NEG mg/dL    Ketone Negative NEG mg/dL    Bilirubin Negative NEG      Blood MODERATE (A) NEG      Urobilinogen 0.2 0.2 - 1.0 EU/dL    Nitrites Negative NEG      Leukocyte Esterase Negative NEG     DRUG SCREEN, URINE   Result Value Ref Range    AMPHETAMINES Negative NEG      BARBITURATES Negative NEG      BENZODIAZEPINES Negative NEG      COCAINE Negative NEG      METHADONE Negative NEG      OPIATES Negative NEG      PCP(PHENCYCLIDINE) Negative NEG      THC (TH-CANNABINOL) Positive (A) NEG      Drug screen comment (NOTE)    ACETAMINOPHEN   Result Value Ref Range    Acetaminophen level <2 (L) 10 - 30 ug/mL   SALICYLATE   Result Value Ref Range    Salicylate level <3.3 (L) 2.8 - 20.0 MG/DL   MAGNESIUM   Result Value Ref Range    Magnesium 1.9 1.6 - 2.4 mg/dL   ETHYL ALCOHOL   Result Value Ref Range    ALCOHOL(ETHYL),SERUM 40 (H) <10 MG/DL   TROPONIN I   Result Value Ref Range    Troponin-I, Qt. <0.05 <0.05 ng/mL   URINE MICROSCOPIC ONLY   Result Value Ref Range    WBC 5-10 0 - 4 /hpf    RBC 10-20 0 - 5 /hpf    Epithelial cells FEW FEW /lpf    Bacteria Negative NEG /hpf    Mucus TRACE /lpf   COVID-19 WITH INFLUENZA A/B   Result Value Ref Range    SARS-CoV-2 Not detected NOTD      Influenza A by PCR Not detected NOTD      Influenza B by PCR Not detected NOTD     CK W/ REFLX CKMB   Result Value Ref Range    CK 1,628 (H) 39 - 308 U/L   TROPONIN I   Result Value Ref Range    Troponin-I, Qt. <0.05 <0.05 ng/mL   CK-MB,QUANT. Result Value Ref Range    CK - MB 3.1 <3.6 NG/ML    CK-MB Index 0.2 0.0 - 2.5     CK   Result Value Ref Range    CK 1,585 (H) 39 - 308 U/L   LIPID PANEL   Result Value Ref Range    Cholesterol, total 124 <200 MG/DL    Triglyceride 43 <150 MG/DL    HDL Cholesterol 61 MG/DL    LDL, calculated 54.4 0 - 100 MG/DL    VLDL, calculated 8.6 MG/DL    CHOL/HDL Ratio 2.0 0.0 - 5.0     SAMPLES BEING HELD   Result Value Ref Range    SAMPLES BEING HELD 1SST, 1LAV     COMMENT        Add-on orders for these samples will be processed based on acceptable specimen integrity and analyte stability, which may vary by analyte.    EKG, 12 LEAD, INITIAL   Result Value Ref Range    Ventricular Rate 112 BPM    Atrial Rate 112 BPM    P-R Interval 142 ms    QRS Duration 78 ms    Q-T Interval 322 ms    QTC Calculation (Bezet) 439 ms    Calculated P Axis 63 degrees    Calculated R Axis 84 degrees    Calculated T Axis 60 degrees    Diagnosis       Sinus tachycardia  ST elevation, consider anterolateral injury or acute infarct  ** ** ACUTE MI / STEMI ** **  Abnormal ECG  No previous ECGs available         Immunizations administered during this encounter:   Immunization History   Administered Date(s) Administered    Covid-19, MODERNA, Mrna, Lnp-s, Pf, 100mcg/0.5mL 05/06/2021    DTaP 2001, 02/28/2002, 04/24/2002, 01/24/2003, 04/26/2006    HPV (9-valent) 06/30/2016, 10/31/2016    HPV (Quad) 04/28/2016    Hep A Vaccine 2 Dose Schedule (Ped/Adol) 01/26/2018, 03/06/2020    Hep B Vaccine 2000, 2001, 04/24/2002    Hib 2001, 02/28/2002, 04/24/2002, 01/24/2003    Influenza Vaccine Barafon) PF (>6 Mo Flulaval, Fluarix, and >3 Yrs [place], Fluzone [id number]) 01/28/2019, 01/30/2020    MMR 10/24/2002, 04/26/2006    Meningococcal (MCV4O) Vaccine 01/26/2018    Meningococcal (MCV4P) Vaccine 04/28/2016    Meningococcal B (OMV) Vaccine 01/30/2020, 03/06/2020    Pneumococcal Vaccine (Unspecified Type) 2001, 02/28/2002, 04/24/2002, 10/24/2002    Poliovirus vaccine 2001, 02/28/2002, 01/24/2003, 04/26/2006    Tdap 06/06/2014, 05/07/2020    Varicella Virus Vaccine 10/24/2002, 05/10/2007       Screening for Metabolic Disorders for Patients on Antipsychotic Medications  (Data obtained from the EMR)    Estimated Body Mass Index  Estimated body mass index is 17.13 kg/m² as calculated from the following:    Height as of this encounter: 5' 9\" (1.753 m). Weight as of this encounter: 52.6 kg (116 lb). Vital Signs/Blood Pressure  Visit Vitals  /87 (BP 1 Location: Left upper arm, BP Patient Position: Sitting)   Pulse 73   Temp 97.7 °F (36.5 °C)   Resp 17   Ht 5' 9\" (1.753 m)   Wt 52.6 kg (116 lb)   SpO2 100%   BMI 17.13 kg/m²       Blood Glucose/Hemoglobin A1c  Lab Results   Component Value Date/Time    Glucose 93 05/23/2021 03:15 AM       No results found for: HBA1C, VIE1RIQR     Lipid Panel  Lab Results   Component Value Date/Time    Cholesterol, total 124 05/24/2021 05:39 AM    HDL Cholesterol 61 05/24/2021 05:39 AM    LDL, calculated 54.4 05/24/2021 05:39 AM    Triglyceride 43 05/24/2021 05:39 AM    CHOL/HDL Ratio 2.0 05/24/2021 05:39 AM        Discharge Diagnosis: Adjustment Disorder with depressed mood    Discharge Plan: Please keep all scheduled follow up appointments. If you are unable to keep your appointment with your physician or therapist, please call them at least 24 hours in advance, if possible, so another appointment can be scheduled. It is important that you maintain contact with your physician(s) after discharge.     Discharge Medication List and Instructions:   Current Discharge Medication List      START taking these medications    Details hydrOXYzine pamoate (VISTARIL) 50 mg capsule Take 1 Capsule by mouth two (2) times daily as needed for Anxiety. Qty: 30 Capsule, Refills: 1  Start date: 5/25/2021             Unresulted Labs (24h ago, onward) Comment    None        To obtain results of studies pending at discharge, please contact     Follow-up Information     Follow up With Specialties Details Why Contact Info    Jannet Padgett NP Nurse Practitioner Go to As needed [facility]  [phone]      2 [facility]  Call As needed [address]  [phone]          Advanced Directive:   Does the patient have an appointed surrogate decision maker? No  Does the patient have a Medical Advance Directive? No  Does the patient have a Psychiatric Advance Directive? No  If the patient does not have a surrogate or Medical Advance Directive AND Psychiatric Advance Directive, the patient was offered information on these advance directives Yes and Patient will complete at a later time    Patient Instructions: Please continue all medications until otherwise directed by physician. Tobacco Cessation Discharge Plan:   Is the patient a smoker and needs referral for smoking cessation? No  Patient referred to the following for smoking cessation with an appointment? Not applicable     Patient was offered medication to assist with smoking cessation at discharge? Not applicable  Was education for smoking cessation added to the discharge instructions? Not applicable    Alcohol/Substance Abuse Discharge Plan:   Does the patient have a history of substance/alcohol abuse and requires a referral for treatment? No  Patient referred to the following for substance/alcohol abuse treatment with an appointment? Not applicable  Patient was offered medication to assist with alcohol cessation at discharge? Not applicable  Was education for substance/alcohol abuse added to discharge instructions?  Not applicable    Patient discharged to Home; discussed with patient/caregiver and provided to the patient/caregiver either in hard copy or electronically.

## 2021-05-25 NOTE — BH NOTES
Patient will be discharged to home with his grandmother this date. He is not interested in follow up. He will see his PCP as needed and was given the information to the [facility] for future reference if needed. Patient was involved in his discharge plan and agreeable. [address] transition of care was routed to his PCPs office and he was given a copy of this as well.

## 2021-05-26 NOTE — DISCHARGE SUMMARY
[facility] DISCHARGE    Name:  Yumiko Dunlap  MR#:  [id number]  :  2000  ACCOUNT #:  [de-identified]  ADMIT DATE:  2021  DISCHARGE DATE:  2021    [facility] DISCHARGE SUMMARY    NOTE:  Greater than 35 minutes was spent in the preparation of this discharge. DISCHARGE DIAGNOSES:  AXIS I:  Adjustment disorder with depressed mood (F43.21). 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 AXIS III:  None. AXIS IV:  Stressors are moderate (ongoing conflict with mother). AXIS V:  Global Assessment of Functioning at discharge is 75. DISCHARGE MEDICATIONS:  Vistaril 50 mg b.i.d. p.r.n. anxiety - - #30 pills with one refill. This prescription was sent to the [facility] in [place], electronically. DISPOSITION/FOLLOWUP PLANS:  The patient is being discharged in stable condition later this morning in the care of his family. He will be staying with his grandmother at this point. He was not interested in any outpatient treatment other than taking the Vistaril p.r.n. on rare occasions. However, a followup was suggested with the [facility] if needed, provided the number for that agency. HOSPITAL COURSE:  As noted in the above admission note, the patient is a 55-year-old single male who has no past psychiatric history, but who was admitted voluntarily from the hospitals Emergency Room due to concerns about acute onset of depressive symptoms and some suicidal thinking. This occurred in the context of a conflict with his mother, as he graduated from high school on , and apparently she did not come to the ceremony, which he did not realize until at the ceremony or afterwards. He also apparently had drank a bit of alcohol as his level was 71 when he was seen in the emergency room.   Perhaps, because of that and as well as residual anger in dealing with his mother, he became very distraught after the ceremony when he got back home, was very tearful, but also very angry and agitated. He made comments that he did not want to live, just wanted to go to sleep and not wake up, and he also made comments and even feigned going for a gun at the house, but his family was able to easily keep him from acting on those impulses. In the emergency room, he continued to vacillate back and forth about whether he was truly having suicidal thoughts or not, although no one felt that he was necessarily high risk for wanting to truly end his life; however, hospitalization was recommended and he agreed with that and came to the unit voluntarily. His brief hospitalization was uneventful in that he showed a fairly full affective range throughout, and his mood had improved completely by the time the next morning when I saw him (05/24). He was very cooperative, was an active participant within the groups and milieu, and he exhibited no overt depressive symptoms and no neurovegetative dysfunctioning. He did take a couple doses of the Vistaril when having to deal with family issues and feeling anxious, and it seemed to work quite well by his report. We, therefore, decided to continue with that post discharge, the use of that sparingly until he acclimates to some of the issues related to conflict with his mother. He will be staying with his grandmother post discharge, and he has a very supportive grandmother as well as his father and even other members in the family who are also quite supportive. He was certainly felt to be safe and stable for discharge when he was released on 05/25/2021. He had no side effects with the Vistaril at all. He did take trazodone at bedtime for sleep, but the 50 mg was a bit sedating, and it was felt that he would not really need a sleeping medication once he is settled in at home. He had no medical or physical problems either and was felt to be medically stable for discharge.     LABORATORY DATA:  Laboratory tests on admission were unremarkable except for a potassium which was minimally low at 3.3 and a CK level which was elevated at 1628. Followup just several hours later, had already dropped to 1585. His Lipid panel was entirely normal when checked on 05/24. Urine drug screen was positive for THC and alcohol level initially was 71 in the emergency room with a followup of 40, checked a couple hours later. An EKG showed a normal QTc interval of 421 milliseconds at his last check.       Afia Carrillo MD      [email or web address]  D:  05/25/2021 10:45  T:  05/26/2021 6:15  JOB #:  [id number]

## 2021-05-27 NOTE — BH NOTES
SW left message with patient for hospital discharge follow up phone call to check on him and to call us back if he needed anything.

## 2021-06-25 ENCOUNTER — HOSPITAL ENCOUNTER (EMERGENCY)
Age: 21
Discharge: HOME | End: 2021-06-25
Attending: EMERGENCY MEDICINE
Payer: MEDICAID

## 2021-06-25 ENCOUNTER — APPOINTMENT (OUTPATIENT)
Dept: CT IMAGING | Age: 21
End: 2021-06-25
Attending: EMERGENCY MEDICINE
Payer: MEDICAID

## 2021-06-25 VITALS
TEMPERATURE: 98.1 F | BODY MASS INDEX: 17.64 KG/M2 | RESPIRATION RATE: 16 BRPM | OXYGEN SATURATION: 99 % | HEART RATE: 79 BPM | SYSTOLIC BLOOD PRESSURE: 113 MMHG | DIASTOLIC BLOOD PRESSURE: 61 MMHG | HEIGHT: 68 IN

## 2021-06-25 DIAGNOSIS — R10.84 ABDOMINAL PAIN, GENERALIZED: Primary | ICD-10-CM

## 2021-06-25 DIAGNOSIS — K59.00 OBSTIPATION: ICD-10-CM

## 2021-06-25 LAB
ALBUMIN SERPL-MCNC: 3.7 G/DL (ref 3.5–5)
ALBUMIN/GLOB SERPL: 1 {RATIO} (ref 1.1–2.2)
ALP SERPL-CCNC: 58 U/L (ref 45–117)
ALT SERPL-CCNC: 20 U/L (ref 12–78)
ANION GAP SERPL CALC-SCNC: 8 MMOL/L (ref 5–15)
APPEARANCE UR: CLEAR
AST SERPL-CCNC: 14 U/L (ref 15–37)
BACTERIA URNS QL MICRO: NEGATIVE /HPF
BASOPHILS # BLD: 0.1 K/UL (ref 0–0.1)
BASOPHILS NFR BLD: 1 % (ref 0–1)
BILIRUB SERPL-MCNC: 0.2 MG/DL (ref 0.2–1)
BILIRUB UR QL: NEGATIVE
BUN SERPL-MCNC: 13 MG/DL (ref 6–20)
BUN/CREAT SERPL: 12 (ref 12–20)
CALCIUM SERPL-MCNC: 9.4 MG/DL (ref 8.5–10.1)
CHLORIDE SERPL-SCNC: 104 MMOL/L (ref 97–108)
CO2 SERPL-SCNC: 30 MMOL/L (ref 21–32)
COLOR UR: ABNORMAL
CREAT SERPL-MCNC: 1.07 MG/DL (ref 0.7–1.3)
DIFFERENTIAL METHOD BLD: ABNORMAL
EOSINOPHIL # BLD: 0.2 K/UL (ref 0–0.4)
EOSINOPHIL NFR BLD: 2 % (ref 0–7)
EPITH CASTS URNS QL MICRO: NORMAL /LPF
ERYTHROCYTE [DISTWIDTH] IN BLOOD BY AUTOMATED COUNT: 12.6 % (ref 11.5–14.5)
GLOBULIN SER CALC-MCNC: 3.7 G/DL (ref 2–4)
GLUCOSE SERPL-MCNC: 96 MG/DL (ref 65–100)
GLUCOSE UR STRIP.AUTO-MCNC: NEGATIVE MG/DL
HCT VFR BLD AUTO: 39.2 % (ref 36.6–50.3)
HGB BLD-MCNC: 13.3 G/DL (ref 12.1–17)
HGB UR QL STRIP: ABNORMAL
IMM GRANULOCYTES # BLD AUTO: 0 K/UL (ref 0–0.04)
IMM GRANULOCYTES NFR BLD AUTO: 0 % (ref 0–0.5)
KETONES UR QL STRIP.AUTO: NEGATIVE MG/DL
LEUKOCYTE ESTERASE UR QL STRIP.AUTO: NEGATIVE
LIPASE SERPL-CCNC: 64 U/L (ref 73–393)
LYMPHOCYTES # BLD: 4 K/UL (ref 0.8–3.5)
LYMPHOCYTES NFR BLD: 54 % (ref 12–49)
MCH RBC QN AUTO: 29.6 PG (ref 26–34)
MCHC RBC AUTO-ENTMCNC: 33.9 G/DL (ref 30–36.5)
MCV RBC AUTO: 87.3 FL (ref 80–99)
MONOCYTES # BLD: 0.4 K/UL (ref 0–1)
MONOCYTES NFR BLD: 5 % (ref 5–13)
NEUTS SEG # BLD: 2.9 K/UL (ref 1.8–8)
NEUTS SEG NFR BLD: 38 % (ref 32–75)
NITRITE UR QL STRIP.AUTO: NEGATIVE
NRBC # BLD: 0 K/UL (ref 0–0.01)
NRBC BLD-RTO: 0 PER 100 WBC
PH UR STRIP: 7 [PH] (ref 5–8)
PLATELET # BLD AUTO: 222 K/UL (ref 150–400)
PMV BLD AUTO: 10.5 FL (ref 8.9–12.9)
POTASSIUM SERPL-SCNC: 3.7 MMOL/L (ref 3.5–5.1)
PROT SERPL-MCNC: 7.4 G/DL (ref 6.4–8.2)
PROT UR STRIP-MCNC: NEGATIVE MG/DL
RBC # BLD AUTO: 4.49 M/UL (ref 4.1–5.7)
RBC #/AREA URNS HPF: NORMAL /HPF (ref 0–5)
SODIUM SERPL-SCNC: 142 MMOL/L (ref 136–145)
SP GR UR REFRACTOMETRY: 1.01 (ref 1–1.03)
UROBILINOGEN UR QL STRIP.AUTO: 0.2 EU/DL (ref 0.2–1)
WBC # BLD AUTO: 7.5 K/UL (ref 4.1–11.1)
WBC URNS QL MICRO: NORMAL /HPF (ref 0–4)

## 2021-06-25 PROCEDURE — 81001 URINALYSIS AUTO W/SCOPE: CPT

## 2021-06-25 PROCEDURE — 83690 ASSAY OF LIPASE: CPT

## 2021-06-25 PROCEDURE — 74011250636 HC RX REV CODE- 250/636: Performed by: EMERGENCY MEDICINE

## 2021-06-25 PROCEDURE — 74177 CT ABD & PELVIS W/CONTRAST: CPT

## 2021-06-25 PROCEDURE — 36415 COLL VENOUS BLD VENIPUNCTURE: CPT

## 2021-06-25 PROCEDURE — 80053 COMPREHEN METABOLIC PANEL: CPT

## 2021-06-25 PROCEDURE — 74011000636 HC RX REV CODE- 636: Performed by: EMERGENCY MEDICINE

## 2021-06-25 PROCEDURE — 99283 EMERGENCY DEPT VISIT LOW MDM: CPT

## 2021-06-25 PROCEDURE — 85025 COMPLETE CBC W/AUTO DIFF WBC: CPT

## 2021-06-25 RX ORDER — POLYETHYLENE GLYCOL 3350 17 G/17G
17 POWDER, FOR SOLUTION ORAL DAILY
Qty: 255 G | Refills: 0 | Status: SHIPPED | OUTPATIENT
Start: 2021-06-25

## 2021-06-25 RX ORDER — SODIUM CHLORIDE 0.9 % (FLUSH) 0.9 %
5-10 SYRINGE (ML) INJECTION ONCE
Status: DISCONTINUED | OUTPATIENT
Start: 2021-06-25 | End: 2021-06-26 | Stop reason: HOSPADM

## 2021-06-25 RX ADMIN — SODIUM CHLORIDE 1000 ML: 9 INJECTION, SOLUTION INTRAVENOUS at 20:19

## 2021-06-25 RX ADMIN — IOPAMIDOL 100 ML: 612 INJECTION, SOLUTION INTRAVENOUS at 21:33

## 2021-06-26 NOTE — ED NOTES
TRANSFER - OUT REPORT:    Verbal report given to nemo wakefield rn(name) on Teresa Sandoval  being transferred to [facility] rm 2217(unit) for routine progression of care       Report consisted of patients Situation, Background, Assessment and   Recommendations(SBAR). Information from the following report(s) SBAR, ED Summary, Procedure Summary, Intake/Output, MAR and Recent Results was reviewed with the receiving nurse. Lines:   Peripheral IV 06/25/21 Left Antecubital (Active)        Opportunity for questions and clarification was provided.       Patient transported with:   Monitor

## 2021-06-26 NOTE — PROGRESS NOTES
2000 - saline lock placed to [address] [place] [address]  W/o difficulty - labs drawn and carried to lab.
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
## 2021-09-19 ENCOUNTER — HOSPITAL ENCOUNTER (EMERGENCY)
Age: 21
Discharge: HOME | End: 2021-09-19
Attending: FAMILY MEDICINE
Payer: MEDICAID

## 2021-09-19 VITALS
TEMPERATURE: 98.2 F | HEIGHT: 68 IN | SYSTOLIC BLOOD PRESSURE: 140 MMHG | HEART RATE: 65 BPM | DIASTOLIC BLOOD PRESSURE: 65 MMHG | WEIGHT: 124 LBS | BODY MASS INDEX: 18.79 KG/M2 | RESPIRATION RATE: 18 BRPM | OXYGEN SATURATION: 98 %

## 2021-09-19 DIAGNOSIS — G44.209 ACUTE NON INTRACTABLE TENSION-TYPE HEADACHE: Primary | ICD-10-CM

## 2021-09-19 PROCEDURE — 74011250637 HC RX REV CODE- 250/637: Performed by: FAMILY MEDICINE

## 2021-09-19 PROCEDURE — 99283 EMERGENCY DEPT VISIT LOW MDM: CPT

## 2021-09-19 RX ORDER — LEVETIRACETAM 500 MG/1
500 TABLET ORAL 2 TIMES DAILY
Qty: 20 TABLET | Refills: 0 | Status: SHIPPED | OUTPATIENT
Start: 2021-09-19 | End: 2022-02-28 | Stop reason: ALTCHOICE

## 2021-09-19 RX ORDER — ACETAMINOPHEN 500 MG
1000 TABLET ORAL ONCE
Status: COMPLETED | OUTPATIENT
Start: 2021-09-19 | End: 2021-09-19

## 2021-09-19 RX ORDER — IBUPROFEN 600 MG/1
600 TABLET ORAL
Status: COMPLETED | OUTPATIENT
Start: 2021-09-19 | End: 2021-09-19

## 2021-09-19 RX ADMIN — ACETAMINOPHEN 1000 MG: 500 TABLET ORAL at 19:54

## 2021-09-19 RX ADMIN — IBUPROFEN 600 MG: 600 TABLET, FILM COATED ORAL at 19:54

## 2021-09-20 NOTE — DISCHARGE INSTRUCTIONS
--Tylenol 1000 mg every 6 hours as needed for pain. --Ibuprofen 600 mg every 6 hours as needed for pain. Take with food.
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
## 2022-02-24 ENCOUNTER — HOSPITAL ENCOUNTER (EMERGENCY)
Age: 22
Discharge: HOME | End: 2022-02-24
Attending: FAMILY MEDICINE
Payer: MEDICAID

## 2022-02-24 ENCOUNTER — APPOINTMENT (OUTPATIENT)
Dept: CT IMAGING | Age: 22
End: 2022-02-24
Attending: FAMILY MEDICINE
Payer: MEDICAID

## 2022-02-24 VITALS
SYSTOLIC BLOOD PRESSURE: 114 MMHG | OXYGEN SATURATION: 100 % | DIASTOLIC BLOOD PRESSURE: 67 MMHG | HEART RATE: 65 BPM | BODY MASS INDEX: 18.66 KG/M2 | TEMPERATURE: 97.8 F | RESPIRATION RATE: 15 BRPM | WEIGHT: 126 LBS | HEIGHT: 69 IN

## 2022-02-24 DIAGNOSIS — R10.84 ABDOMINAL PAIN, GENERALIZED: Primary | ICD-10-CM

## 2022-02-24 LAB
ALBUMIN SERPL-MCNC: 3.8 G/DL (ref 3.5–5)
ALBUMIN/GLOB SERPL: 1.2 {RATIO} (ref 1.1–2.2)
ALP SERPL-CCNC: 50 U/L (ref 45–117)
ALT SERPL-CCNC: 108 U/L (ref 12–78)
ANION GAP SERPL CALC-SCNC: 7 MMOL/L (ref 5–15)
APPEARANCE UR: CLEAR
AST SERPL-CCNC: 74 U/L (ref 15–37)
BACTERIA URNS QL MICRO: NEGATIVE /HPF
BASOPHILS # BLD: 0.1 K/UL (ref 0–0.1)
BASOPHILS NFR BLD: 1 % (ref 0–1)
BILIRUB SERPL-MCNC: 0.3 MG/DL (ref 0.2–1)
BILIRUB UR QL: NEGATIVE
BUN SERPL-MCNC: 14 MG/DL (ref 6–20)
BUN/CREAT SERPL: 16 (ref 12–20)
CALCIUM SERPL-MCNC: 9 MG/DL (ref 8.5–10.1)
CHLORIDE SERPL-SCNC: 106 MMOL/L (ref 97–108)
CO2 SERPL-SCNC: 26 MMOL/L (ref 21–32)
COLOR UR: ABNORMAL
CREAT SERPL-MCNC: 0.9 MG/DL (ref 0.7–1.3)
DIFFERENTIAL METHOD BLD: ABNORMAL
EOSINOPHIL # BLD: 0.2 K/UL (ref 0–0.4)
EOSINOPHIL NFR BLD: 2 % (ref 0–7)
EPITH CASTS URNS QL MICRO: NORMAL /LPF
ERYTHROCYTE [DISTWIDTH] IN BLOOD BY AUTOMATED COUNT: 13.9 % (ref 11.5–14.5)
GLOBULIN SER CALC-MCNC: 3.3 G/DL (ref 2–4)
GLUCOSE SERPL-MCNC: 101 MG/DL (ref 65–100)
GLUCOSE UR STRIP.AUTO-MCNC: NEGATIVE MG/DL
HCT VFR BLD AUTO: 35.2 % (ref 36.6–50.3)
HGB BLD-MCNC: 12.2 G/DL (ref 12.1–17)
HGB UR QL STRIP: ABNORMAL
IMM GRANULOCYTES # BLD AUTO: 0.1 K/UL (ref 0–0.04)
IMM GRANULOCYTES NFR BLD AUTO: 1 % (ref 0–0.5)
KETONES UR QL STRIP.AUTO: NEGATIVE MG/DL
LEUKOCYTE ESTERASE UR QL STRIP.AUTO: NEGATIVE
LIPASE SERPL-CCNC: 116 U/L (ref 73–393)
LYMPHOCYTES # BLD: 4 K/UL (ref 0.8–3.5)
LYMPHOCYTES NFR BLD: 48 % (ref 12–49)
MAGNESIUM SERPL-MCNC: 2 MG/DL (ref 1.6–2.4)
MCH RBC QN AUTO: 30.7 PG (ref 26–34)
MCHC RBC AUTO-ENTMCNC: 34.7 G/DL (ref 30–36.5)
MCV RBC AUTO: 88.4 FL (ref 80–99)
MONOCYTES # BLD: 0.5 K/UL (ref 0–1)
MONOCYTES NFR BLD: 6 % (ref 5–13)
NEUTS SEG # BLD: 3.5 K/UL (ref 1.8–8)
NEUTS SEG NFR BLD: 42 % (ref 32–75)
NITRITE UR QL STRIP.AUTO: NEGATIVE
NRBC # BLD: 0 K/UL (ref 0–0.01)
NRBC BLD-RTO: 0 PER 100 WBC
PH UR STRIP: 6 [PH] (ref 5–8)
PLATELET # BLD AUTO: 261 K/UL (ref 150–400)
PMV BLD AUTO: 10.7 FL (ref 8.9–12.9)
POTASSIUM SERPL-SCNC: 3.6 MMOL/L (ref 3.5–5.1)
PROT SERPL-MCNC: 7.1 G/DL (ref 6.4–8.2)
PROT UR STRIP-MCNC: NEGATIVE MG/DL
RBC # BLD AUTO: 3.98 M/UL (ref 4.1–5.7)
RBC #/AREA URNS HPF: NORMAL /HPF (ref 0–5)
SODIUM SERPL-SCNC: 139 MMOL/L (ref 136–145)
SP GR UR REFRACTOMETRY: 1.02 (ref 1–1.03)
UROBILINOGEN UR QL STRIP.AUTO: 0.2 EU/DL (ref 0.2–1)
WBC # BLD AUTO: 8.3 K/UL (ref 4.1–11.1)
WBC URNS QL MICRO: NORMAL /HPF (ref 0–4)

## 2022-02-24 PROCEDURE — 74011250636 HC RX REV CODE- 250/636: Performed by: FAMILY MEDICINE

## 2022-02-24 PROCEDURE — 81001 URINALYSIS AUTO W/SCOPE: CPT

## 2022-02-24 PROCEDURE — 74011000636 HC RX REV CODE- 636: Performed by: FAMILY MEDICINE

## 2022-02-24 PROCEDURE — 96374 THER/PROPH/DIAG INJ IV PUSH: CPT

## 2022-02-24 PROCEDURE — 36415 COLL VENOUS BLD VENIPUNCTURE: CPT

## 2022-02-24 PROCEDURE — 99285 EMERGENCY DEPT VISIT HI MDM: CPT

## 2022-02-24 PROCEDURE — 74011250637 HC RX REV CODE- 250/637: Performed by: FAMILY MEDICINE

## 2022-02-24 PROCEDURE — 83735 ASSAY OF MAGNESIUM: CPT

## 2022-02-24 PROCEDURE — 85025 COMPLETE CBC W/AUTO DIFF WBC: CPT

## 2022-02-24 PROCEDURE — 83690 ASSAY OF LIPASE: CPT

## 2022-02-24 PROCEDURE — 80053 COMPREHEN METABOLIC PANEL: CPT

## 2022-02-24 PROCEDURE — 96376 TX/PRO/DX INJ SAME DRUG ADON: CPT

## 2022-02-24 PROCEDURE — 74177 CT ABD & PELVIS W/CONTRAST: CPT

## 2022-02-24 RX ORDER — ONDANSETRON 2 MG/ML
4 INJECTION INTRAMUSCULAR; INTRAVENOUS
Status: COMPLETED | OUTPATIENT
Start: 2022-02-24 | End: 2022-02-24

## 2022-02-24 RX ORDER — DICYCLOMINE HYDROCHLORIDE 20 MG/1
20 TABLET ORAL
Status: COMPLETED | OUTPATIENT
Start: 2022-02-24 | End: 2022-02-24

## 2022-02-24 RX ORDER — DOXYCYCLINE 100 MG/1
100 CAPSULE ORAL 2 TIMES DAILY
COMMUNITY
End: 2022-02-28 | Stop reason: ALTCHOICE

## 2022-02-24 RX ORDER — ONDANSETRON 4 MG/1
4 TABLET, ORALLY DISINTEGRATING ORAL
Qty: 20 TABLET | Refills: 0 | Status: SHIPPED | OUTPATIENT
Start: 2022-02-24

## 2022-02-24 RX ORDER — DICYCLOMINE HYDROCHLORIDE 20 MG/1
20 TABLET ORAL EVERY 6 HOURS
Qty: 40 TABLET | Refills: 0 | Status: SHIPPED | OUTPATIENT
Start: 2022-02-24 | End: 2022-02-28 | Stop reason: ALTCHOICE

## 2022-02-24 RX ORDER — MIRTAZAPINE 7.5 MG/1
7.5 TABLET, FILM COATED ORAL AT BEDTIME
COMMUNITY
Start: 2022-02-18 | End: 2022-03-21

## 2022-02-24 RX ADMIN — DICYCLOMINE HYDROCHLORIDE 20 MG: 20 TABLET ORAL at 01:18

## 2022-02-24 RX ADMIN — ONDANSETRON 4 MG: 2 INJECTION INTRAMUSCULAR; INTRAVENOUS at 01:19

## 2022-02-24 RX ADMIN — ONDANSETRON 4 MG: 2 INJECTION INTRAMUSCULAR; INTRAVENOUS at 02:03

## 2022-02-24 RX ADMIN — IOPAMIDOL 100 ML: 612 INJECTION, SOLUTION INTRAVENOUS at 02:20

## 2022-02-24 NOTE — ED TRIAGE NOTES
Pt arrived via POV from home accompanied by family with 5 minute history of mid abdominal pain with n/v. States that he vomited x 4 within 5 minutes. Taking an ABT for a cut on his right thigh per pt.
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
## 2022-02-24 NOTE — DISCHARGE INSTRUCTIONS
Zofran as needed for nausea, Bentyl as needed for crampy abdominal pain. Return for fever, blood in vomit or stool, uncontrolled pain, problems as noted above. Follow-up with MD next week as planned. Ask them to review today's note, x-rays, and labs. The may consider repeating your liver function studies sometime in the future, and possible GI referral versus following course of medication.

## 2022-02-28 ENCOUNTER — OFFICE VISIT (OUTPATIENT)
Dept: FAMILY MEDICINE CLINIC | Age: 22
End: 2022-02-28
Payer: MEDICAID

## 2022-02-28 VITALS
BODY MASS INDEX: 19.11 KG/M2 | DIASTOLIC BLOOD PRESSURE: 68 MMHG | RESPIRATION RATE: 17 BRPM | HEART RATE: 90 BPM | WEIGHT: 129 LBS | OXYGEN SATURATION: 98 % | TEMPERATURE: 98.5 F | HEIGHT: 69 IN | SYSTOLIC BLOOD PRESSURE: 118 MMHG

## 2022-02-28 DIAGNOSIS — K59.09 CHRONIC CONSTIPATION: ICD-10-CM

## 2022-02-28 DIAGNOSIS — F33.2 SEVERE EPISODE OF RECURRENT MAJOR DEPRESSIVE DISORDER, WITHOUT PSYCHOTIC FEATURES (HCC): Primary | ICD-10-CM

## 2022-02-28 PROCEDURE — 99214 OFFICE O/P EST MOD 30 MIN: CPT | Performed by: NURSE PRACTITIONER

## 2022-02-28 RX ORDER — SERTRALINE HYDROCHLORIDE 25 MG/1
25 TABLET, FILM COATED ORAL DAILY
Qty: 30 TABLET | Refills: 2 | Status: SHIPPED | OUTPATIENT
Start: 2022-02-28 | End: 2022-03-07 | Stop reason: SDUPTHER

## 2022-02-28 RX ORDER — AMOXICILLIN 250 MG
1 CAPSULE ORAL DAILY
Qty: 90 TABLET | Refills: 4 | Status: SHIPPED | OUTPATIENT
Start: 2022-02-28

## 2022-02-28 NOTE — PROGRESS NOTES
Chief Complaint   Patient presents with    Physical    Depression     severe depression, recently seen at the [facility] and stayed from 02/14-02/18. Was put on Remeron. Pt stated no improvement  noted on medicaiton. PHQ of 27 tpdau/      3 most recent PHQ Screens 2/28/2022   Little interest or pleasure in doing things Nearly every day   Feeling down, depressed, irritable, or hopeless Nearly every day   Total Score PHQ 2 6   Trouble falling or staying asleep, or sleeping too much Nearly every day   Feeling tired or having little energy Nearly every day   Poor appetite, weight loss, or overeating Nearly every day   Feeling bad about yourself - or that you are a failure or have let yourself or your family down Nearly every day   Trouble concentrating on things such as school, work, reading, or watching TV Nearly every day   Moving or speaking so slowly that other people could have noticed; or the opposite being so fidgety that others notice Nearly every day   Thoughts of being better off dead, or hurting yourself in some way Nearly every day   PHQ 9 Score 27   How difficult have these problems made it for you to do your work, take care of your home and get along with others Somewhat difficult   In the past year have you felt depressed or sad most days, even if you felt okay? -   Has there been a time in the past month when you have had serious thoughts about ending your life?  -   Have you ever in your whole life, tried to kill yourself or made a suicide attempt? -     Learning Assessment 2/28/2022   PRIMARY LEARNER Patient   HIGHEST LEVEL OF EDUCATION - PRIMARY LEARNER  -   BARRIERS PRIMARY LEARNER -   Judy 88 LEVEL OF EDUCATION -   [address] -    NEED -   LEARNER PREFERENCE PRIMARY READING   LEARNER Letty 11 -   ANSWERED BY patient RELATIONSHIP SELF     Fall Risk Assessment, last 12 mths 2/28/2022   Able to walk? Yes   Fall in past 12 months? 0   Do you feel unsteady? 0   Are you worried about falling 0     Abuse Screening Questionnaire 2/28/2022   Do you ever feel afraid of your partner? N   Are you in a relationship with someone who physically or mentally threatens you? N   Is it safe for you to go home? Y     ADL Assessment 2/28/2022   Feeding yourself No Help Needed   Getting from bed to chair No Help Needed   Getting dressed No Help Needed   Bathing or showering No Help Needed   Walk across the room (includes cane/walker) No Help Needed   Using the telphone No Help Needed   Taking your medications No Help Needed   Preparing meals No Help Needed   Managing money (expenses/bills) No Help Needed   Moderately strenuous housework (laundry) No Help Needed   Shopping for personal items (toiletries/medicines) No Help Needed   Shopping for groceries No Help Needed   Driving No Help Needed   Climbing a flight of stairs No Help Needed   Getting to places beyond walking distances No Help Needed     1. Have you been to the ER, urgent care clinic since your last visit? Hospitalized since your last visit? No    2. Have you seen or consulted any other health care providers outside of the [address] [place] [address] [place] since your last visit? Include any pap smears or colon screening. No      Chief Complaint   Patient presents with    Physical    Depression     severe depression, recently seen at the [facility] and stayed from 02/14-02/18. Was put on Remeron. Pt stated no improvement  noted on medicaiton.   PHQ of 27 tpdau/          Visit Vitals  /68 (BP 1 Location: Left arm, BP Patient Position: Sitting, BP Cuff Size: Adult)   Pulse 90   Temp 98.5 °F (36.9 °C) (Temporal)   Resp 17   Ht 5' 9\" (1.753 m)   Wt 129 lb (58.5 kg)   SpO2 98%   BMI 19.05 kg/m²       Pain Scale: 0 - No pain/10  Pain Location:     Yari Yang is a 22 y.o. male presenting for/with:    Physical and Depression (severe depression, recently seen at the [facility] and stayed from 02/14-02/18. Was put on Remeron. Pt stated no improvement  noted on medicaiton. PHQ of 27 tpdau/ )      Symptom review:    NO  Fever   NO  Shaking chills  NO  Cough  NO  Body aches  NO  Coughing up blood  NO  Chest congestion  NO  Chest pain  NO  Shortness of breath  NO  Profound Loss of smell/taste  NO  Nausea/Vomiting   NO  Loose stool/Diarrhea  NO  any skin issues    Patient Risk Factors Reviewed as follows:  NO  have you been in Close contact with confirmed COVID19 patient   NO  History of recent travel to affected geographical areas within the past 14 days  NO  COPD  NO  Active Cancer/Leukemia/Lymphoma/Chemotherapy  NO  Oral steroid use  NO  Pregnant  NO  Diabetes Mellitus  NO  Heart disease  NO  Asthma  NO Health care worker at home  NO Health care worker  NO Is there a Pregnant Woman in the home  NO Dialysis pt in the home   NO a large number of people living in the home  Recent Travel Screening and Travel History documentation     Travel Screening     Question   Response    In the last month, have you been in contact with someone who was confirmed or suspected to have Coronavirus / COVID-19? No / Unsure    Have you had a COVID-19 viral test in the last 14 days? No    Do you have any of the following new or worsening symptoms? None of these    Have you traveled internationally or domestically in the last month?   No      Travel History   Travel since 01/28/22    No documented travel since 01/28/22
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
## 2022-03-07 ENCOUNTER — OFFICE VISIT (OUTPATIENT)
Dept: FAMILY MEDICINE CLINIC | Age: 22
End: 2022-03-07
Payer: MEDICAID

## 2022-03-07 VITALS
RESPIRATION RATE: 18 BRPM | TEMPERATURE: 97.9 F | OXYGEN SATURATION: 98 % | SYSTOLIC BLOOD PRESSURE: 120 MMHG | WEIGHT: 127.2 LBS | HEART RATE: 85 BPM | DIASTOLIC BLOOD PRESSURE: 62 MMHG | HEIGHT: 69 IN | BODY MASS INDEX: 18.84 KG/M2

## 2022-03-07 DIAGNOSIS — F33.2 SEVERE EPISODE OF RECURRENT MAJOR DEPRESSIVE DISORDER, WITHOUT PSYCHOTIC FEATURES (HCC): Primary | ICD-10-CM

## 2022-03-07 PROCEDURE — 99213 OFFICE O/P EST LOW 20 MIN: CPT | Performed by: NURSE PRACTITIONER

## 2022-03-07 RX ORDER — SERTRALINE HYDROCHLORIDE 50 MG/1
50 TABLET, FILM COATED ORAL DAILY
Qty: 30 TABLET | Refills: 2 | Status: SHIPPED | OUTPATIENT
Start: 2022-03-07 | End: 2022-07-05 | Stop reason: SDUPTHER

## 2022-03-07 NOTE — PROGRESS NOTES
Glenys Brewer is a 22 y.o. male presenting for/with:    Chief Complaint   Patient presents with    Depression     follow up       Visit Vitals  /62 (BP 1 Location: Right arm, BP Patient Position: Sitting, BP Cuff Size: Adult long)   Pulse 85   Temp 97.9 °F (36.6 °C) (Temporal)   Resp 18   Ht 5' 9\" (1.753 m)   Wt 127 lb 3.2 oz (57.7 kg)   SpO2 98%   BMI 18.78 kg/m²     Pain Scale: 6/10  Pain Location: Leg (right thigh (bruise noted today per pt denies fall)      3 most recent PHQ Screens 3/7/2022   Little interest or pleasure in doing things Nearly every day   Feeling down, depressed, irritable, or hopeless Nearly every day   Total Score PHQ 2 6   Trouble falling or staying asleep, or sleeping too much Nearly every day   Feeling tired or having little energy Nearly every day   Poor appetite, weight loss, or overeating Nearly every day   Feeling bad about yourself - or that you are a failure or have let yourself or your family down Nearly every day   Trouble concentrating on things such as school, work, reading, or watching TV Nearly every day   Moving or speaking so slowly that other people could have noticed; or the opposite being so fidgety that others notice Nearly every day   Thoughts of being better off dead, or hurting yourself in some way Nearly every day   PHQ 9 Score 27   How difficult have these problems made it for you to do your work, take care of your home and get along with others -   In the past year have you felt depressed or sad most days, even if you felt okay? -   Has there been a time in the past month when you have had serious thoughts about ending your life?  -   Have you ever in your whole life, tried to kill yourself or made a suicide attempt? -     Learning Assessment 3/7/2022   PRIMARY LEARNER Patient   HIGHEST LEVEL OF EDUCATION - PRIMARY LEARNER  -   BARRIERS PRIMARY LEARNER -   [address] [place] [address]  CO-LEARNER -   PRIMARY LANGUAGE ENGLISH   PRIMARY LANGUAGE CO-LEARNER -    NEED -   LEARNER PREFERENCE PRIMARY READING   LEARNER PREFERENCE CO-LEARNER -   LEARNING SPECIAL TOPICS -   ANSWERED BY patient   RELATIONSHIP SELF     Fall Risk Assessment, last 12 mths 3/7/2022   Able to walk? Yes   Fall in past 12 months? 0   Do you feel unsteady? 0   Are you worried about falling 0     Abuse Screening Questionnaire 3/7/2022   Do you ever feel afraid of your partner? N   Are you in a relationship with someone who physically or mentally threatens you? N   Is it safe for you to go home? Y     ADL Assessment 3/7/2022   Feeding yourself No Help Needed   Getting from bed to chair No Help Needed   Getting dressed No Help Needed   Bathing or showering No Help Needed   Walk across the room (includes cane/walker) No Help Needed   Using the telphone No Help Needed   Taking your medications No Help Needed   Preparing meals No Help Needed   Managing money (expenses/bills) No Help Needed   Moderately strenuous housework (laundry) No Help Needed   Shopping for personal items (toiletries/medicines) No Help Needed   Shopping for groceries No Help Needed   Driving No Help Needed   Climbing a flight of stairs No Help Needed   Getting to places beyond walking distances No Help Needed       1. \"Have you been to the ER, urgent care clinic since your last visit? Hospitalized since your last visit? \" No    2. \"Have you seen or consulted any other health care providers outside of the [address] [place] [place] [address] [facility] since your last visit? \" No     3. For patients aged 39-70: Has the patient had a colonoscopy / FIT/ Cologuard? No      If the patient is female:    4. For patients aged 41-77: Has the patient had a mammogram within the past 2 years? No      5. For patients aged 21-65: Has the patient had a pap smear?  No      Recent Travel Screening and Travel History documentation     Travel Screening     Question   Response    In the last month, have you been in contact with someone who was confirmed or suspected to have Coronavirus / COVID-19? No / Unsure    Have you had a COVID-19 viral test in the last 14 days? No    Do you have any of the following new or worsening symptoms? None of these    Have you traveled internationally or domestically in the last month? No      Travel History   Travel since 02/07/22    No documented travel since 02/07/22                 Symptom review:    NO  Fever   NO  Shaking chills  NO  Cough  NO  Body aches  NO  Coughing up blood  NO  Chest congestion  NO  Chest pain  NO  Shortness of breath  NO  Profound Loss of smell/taste  NO  Nausea/Vomiting   NO  Loose stool/Diarrhea  NO  any skin issues    Patient Risk Factors Reviewed as follows:  NO  have you been in Close contact with confirmed COVID19 patient   NO  History of recent travel to affected geographical areas within the past 14 days  NO  COPD  NO  Active Cancer/Leukemia/Lymphoma/Chemotherapy  NO  Oral steroid use  NO  Pregnant  NO  Diabetes Mellitus  NO  Heart disease  NO  Asthma  NO Health care worker at home  [address] care worker  NO Is there a Pregnant Woman in the home  NO Dialysis pt in the home   NO a large number of people living in the home    Advance Care Planning 2/28/2022   Patient's Healthcare Decision Maker is: Legal Next of Kin   Confirm Advance Directive None   Patient Would Like to Complete Advance Directive No   Some encounter information is confidential and restricted. Go to Review Flowsheets activity to see all data.
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
## 2022-03-08 LAB
ALBUMIN SERPL-MCNC: 4 G/DL (ref 3.5–5)
ALBUMIN/GLOB SERPL: 1.4 {RATIO} (ref 1.1–2.2)
ALP SERPL-CCNC: 52 U/L (ref 45–117)
ALT SERPL-CCNC: 35 U/L (ref 12–78)
ANION GAP SERPL CALC-SCNC: 1 MMOL/L (ref 5–15)
AST SERPL-CCNC: 27 U/L (ref 15–37)
BASOPHILS # BLD: 0.1 K/UL (ref 0–0.1)
BASOPHILS NFR BLD: 2 % (ref 0–1)
BILIRUB SERPL-MCNC: 0.4 MG/DL (ref 0.2–1)
BUN SERPL-MCNC: 10 MG/DL (ref 6–20)
BUN/CREAT SERPL: 11 (ref 12–20)
CALCIUM SERPL-MCNC: 9.9 MG/DL (ref 8.5–10.1)
CHLORIDE SERPL-SCNC: 109 MMOL/L (ref 97–108)
CO2 SERPL-SCNC: 30 MMOL/L (ref 21–32)
CREAT SERPL-MCNC: 0.92 MG/DL (ref 0.7–1.3)
DIFFERENTIAL METHOD BLD: ABNORMAL
EOSINOPHIL # BLD: 0.3 K/UL (ref 0–0.4)
EOSINOPHIL NFR BLD: 5 % (ref 0–7)
ERYTHROCYTE [DISTWIDTH] IN BLOOD BY AUTOMATED COUNT: 14.3 % (ref 11.5–14.5)
GLOBULIN SER CALC-MCNC: 2.8 G/DL (ref 2–4)
GLUCOSE SERPL-MCNC: 102 MG/DL (ref 65–100)
HCT VFR BLD AUTO: 37.9 % (ref 36.6–50.3)
HGB BLD-MCNC: 12.3 G/DL (ref 12.1–17)
IMM GRANULOCYTES # BLD AUTO: 0 K/UL
IMM GRANULOCYTES NFR BLD AUTO: 0 %
LYMPHOCYTES # BLD: 2.6 K/UL (ref 0.8–3.5)
LYMPHOCYTES NFR BLD: 45 % (ref 12–49)
MCH RBC QN AUTO: 30.2 PG (ref 26–34)
MCHC RBC AUTO-ENTMCNC: 32.5 G/DL (ref 30–36.5)
MCV RBC AUTO: 93.1 FL (ref 80–99)
MONOCYTES # BLD: 0.8 K/UL (ref 0–1)
MONOCYTES NFR BLD: 13 % (ref 5–13)
NEUTS SEG # BLD: 2.1 K/UL (ref 1.8–8)
NEUTS SEG NFR BLD: 35 % (ref 32–75)
NRBC # BLD: 0 K/UL (ref 0–0.01)
NRBC BLD-RTO: 0 PER 100 WBC
PLATELET # BLD AUTO: 210 K/UL (ref 150–400)
PMV BLD AUTO: 12.2 FL (ref 8.9–12.9)
POTASSIUM SERPL-SCNC: 3.8 MMOL/L (ref 3.5–5.1)
PROT SERPL-MCNC: 6.8 G/DL (ref 6.4–8.2)
RBC # BLD AUTO: 4.07 M/UL (ref 4.1–5.7)
RBC MORPH BLD: ABNORMAL
SODIUM SERPL-SCNC: 140 MMOL/L (ref 136–145)
WBC # BLD AUTO: 5.9 K/UL (ref 4.1–11.1)
WBC MORPH BLD: ABNORMAL

## 2022-03-18 PROBLEM — F43.21 ADJUSTMENT DISORDER WITH DEPRESSED MOOD: Status: ACTIVE | Noted: 2021-05-24

## 2022-03-18 PROBLEM — S89.91XA RIGHT KNEE INJURY: Status: ACTIVE | Noted: 2019-05-30

## 2022-03-19 PROBLEM — M41.9 SCOLIOSIS: Status: ACTIVE | Noted: 2018-01-31

## 2022-03-19 PROBLEM — J45.20 MILD INTERMITTENT ASTHMA WITHOUT COMPLICATION: Status: ACTIVE | Noted: 2020-01-31

## 2022-03-19 PROBLEM — R31.21 ASYMPTOMATIC MICROSCOPIC HEMATURIA: Status: ACTIVE | Noted: 2018-01-31

## 2022-03-21 ENCOUNTER — OFFICE VISIT (OUTPATIENT)
Dept: FAMILY MEDICINE CLINIC | Age: 22
End: 2022-03-21
Payer: MEDICAID

## 2022-03-21 VITALS
RESPIRATION RATE: 19 BRPM | TEMPERATURE: 98.2 F | OXYGEN SATURATION: 100 % | HEART RATE: 85 BPM | WEIGHT: 132.8 LBS | SYSTOLIC BLOOD PRESSURE: 108 MMHG | HEIGHT: 69 IN | DIASTOLIC BLOOD PRESSURE: 60 MMHG | BODY MASS INDEX: 19.67 KG/M2

## 2022-03-21 DIAGNOSIS — F43.21 ADJUSTMENT DISORDER WITH DEPRESSED MOOD: Primary | ICD-10-CM

## 2022-03-21 PROCEDURE — 99213 OFFICE O/P EST LOW 20 MIN: CPT | Performed by: NURSE PRACTITIONER

## 2022-03-21 RX ORDER — MIRTAZAPINE 15 MG/1
TABLET, ORALLY DISINTEGRATING ORAL
COMMUNITY
Start: 2022-02-28 | End: 2022-10-07 | Stop reason: ALTCHOICE
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
## 2022-03-21 NOTE — PROGRESS NOTES
Maik Meier is a 22 y.o. male presenting for/with:    Chief Complaint   Patient presents with    Depression     follow up. Visit Vitals  /60 (BP 1 Location: Left arm, BP Patient Position: Sitting, BP Cuff Size: Adult long)   Pulse 85   Temp 98.2 °F (36.8 °C) (Temporal)   Resp 19   Ht 5' 9\" (1.753 m)   Wt 132 lb 12.8 oz (60.2 kg)   SpO2 100%   BMI 19.61 kg/m²     Pain Scale: 0 - No pain/10  Pain Location:       3 most recent PHQ Screens 3/21/2022   Little interest or pleasure in doing things Not at all   Feeling down, depressed, irritable, or hopeless Not at all   Total Score PHQ 2 0   Trouble falling or staying asleep, or sleeping too much -   Feeling tired or having little energy -   Poor appetite, weight loss, or overeating -   Feeling bad about yourself - or that you are a failure or have let yourself or your family down -   Trouble concentrating on things such as school, work, reading, or watching TV -   Moving or speaking so slowly that other people could have noticed; or the opposite being so fidgety that others notice -   Thoughts of being better off dead, or hurting yourself in some way -   PHQ 9 Score -   How difficult have these problems made it for you to do your work, take care of your home and get along with others -   In the past year have you felt depressed or sad most days, even if you felt okay? -   Has there been a time in the past month when you have had serious thoughts about ending your life?  -   Have you ever in your whole life, tried to kill yourself or made a suicide attempt? -     Learning Assessment 3/21/2022   PRIMARY LEARNER Patient   HIGHEST LEVEL OF EDUCATION - PRIMARY LEARNER  -   BARRIERS PRIMARY LEARNER -   Judy [address] LEVEL OF EDUCATION -   [address] -    NEED -   LEARNER PREFERENCE PRIMARY READING   LEARNER PREFERENCE CO-LEARNER -   LEARNING SPECIAL TOPICS -   ANSWERED BY patient   RELATIONSHIP SELF     Fall Risk Assessment, last 12 mths 3/21/2022   Able to walk? Yes   Fall in past 12 months? 0   Do you feel unsteady? 0   Are you worried about falling 0     Abuse Screening Questionnaire 3/21/2022   Do you ever feel afraid of your partner? N   Are you in a relationship with someone who physically or mentally threatens you? N   Is it safe for you to go home? Y     ADL Assessment 3/21/2022   Feeding yourself No Help Needed   Getting from bed to chair No Help Needed   Getting dressed No Help Needed   Bathing or showering No Help Needed   Walk across the room (includes cane/walker) No Help Needed   Using the telphone No Help Needed   Taking your medications No Help Needed   Preparing meals No Help Needed   Managing money (expenses/bills) No Help Needed   Moderately strenuous housework (laundry) No Help Needed   Shopping for personal items (toiletries/medicines) No Help Needed   Shopping for groceries No Help Needed   Driving No Help Needed   Climbing a flight of stairs No Help Needed   Getting to places beyond walking distances No Help Needed       1. \"Have you been to the ER, urgent care clinic since your last visit? Hospitalized since your last visit? \" No    2. \"Have you seen or consulted any other health care providers outside of the [address] [place] [address] [place] since your last visit? \" No     3. For patients aged 39-70: Has the patient had a colonoscopy / FIT/ Cologuard? No      If the patient is female:    4. For patients aged 41-77: Has the patient had a mammogram within the past 2 years? No      5. For patients aged 21-65: Has the patient had a pap smear?  No          Symptom review:    NO  Fever   NO  Shaking chills  NO  Cough  NO  Body aches  NO  Coughing up blood  NO  Chest congestion  NO  Chest pain  NO  Shortness of breath  NO  Profound Loss of smell/taste  NO  Nausea/Vomiting   NO  Loose stool/Diarrhea  NO  any skin issues    Patient Risk Factors Reviewed as follows:  NO  have you been in Close contact with confirmed COVID19 patient   NO  History of recent travel to affected geographical areas within the past 14 days  NO  COPD  NO  Active Cancer/Leukemia/Lymphoma/Chemotherapy  NO  Oral steroid use  NO  Pregnant  NO  Diabetes Mellitus  NO  Heart disease  NO  Asthma  NO Health care worker at home  NO Health care worker  NO Is there a Pregnant Woman in the home  NO Dialysis pt in the home   NO a large number of people living in the home  Recent Travel Screening and Travel History documentation     Travel Screening     Question   Response    In the last 10 days, have you been in contact with someone who was confirmed or suspected to have Coronavirus/COVID-19? No / Unsure    Have you had a COVID-19 viral test in the last 10 days? No    Do you have any of the following new or worsening symptoms? None of these    Have you traveled internationally or domestically in the last month? No      Travel History   Travel since 02/21/22    No documented travel since 02/21/22               Advance Care Planning 3/21/2022   Patient's Healthcare Decision Maker is: Legal Next of Kin   Confirm Advance Directive None   Patient Would Like to Complete Advance Directive No   Some encounter information is confidential and restricted. Go to Review Flowsheets activity to see all data.

## 2022-06-13 ENCOUNTER — OFFICE VISIT (OUTPATIENT)
Dept: FAMILY MEDICINE CLINIC | Age: 22
End: 2022-06-13
Payer: MEDICAID

## 2022-06-13 VITALS
TEMPERATURE: 97.5 F | OXYGEN SATURATION: 99 % | HEIGHT: 69 IN | WEIGHT: 132 LBS | HEART RATE: 54 BPM | RESPIRATION RATE: 20 BRPM | SYSTOLIC BLOOD PRESSURE: 112 MMHG | BODY MASS INDEX: 19.55 KG/M2 | DIASTOLIC BLOOD PRESSURE: 64 MMHG

## 2022-06-13 DIAGNOSIS — F43.21 ADJUSTMENT DISORDER WITH DEPRESSED MOOD: Primary | ICD-10-CM

## 2022-06-13 PROCEDURE — 99213 OFFICE O/P EST LOW 20 MIN: CPT | Performed by: NURSE PRACTITIONER

## 2022-06-13 RX ORDER — RISPERIDONE 1 MG/1
1 TABLET, FILM COATED ORAL
Qty: 90 TABLET | Refills: 4 | Status: SHIPPED | OUTPATIENT
Start: 2022-06-13

## 2022-06-13 RX ORDER — RISPERIDONE 1 MG/1
1 TABLET, FILM COATED ORAL
COMMUNITY
Start: 2022-05-18 | End: 2022-06-13 | Stop reason: SDUPTHER
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
## 2022-06-13 NOTE — PROGRESS NOTES
Matt Pineda is a 22 y.o. male presenting for/with:    Chief Complaint   Patient presents with    Depression     3 month follow up       Visit Vitals  /64   Pulse (!) 54   Temp 97.5 °F (36.4 °C) (Temporal)   Resp 20   Ht 5' 9\" (1.753 m)   Wt 132 lb (59.9 kg)   SpO2 99%   BMI 19.49 kg/m²     Pain Scale: /10  Pain Location:     1. \"Have you been to the ER, urgent care clinic since your last visit? Hospitalized since your last visit? \" Yes Where: [facility]    2. \"Have you seen or consulted any other health care providers outside of the [address] [place] [address] since your last visit? \" No     3. For patients aged 39-70: Has the patient had a colonoscopy / FIT/ Cologuard? NA - based on age      If the patient is female:    4. For patients aged 41-77: Has the patient had a mammogram within the past 2 years? NA - based on age or sex      11. For patients aged 21-65: Has the patient had a pap smear?  NA - based on age or sex      Symptom review:  NO  Fever   NO  Shaking chills  NO  Cough  NO  Body aches  NO  Coughing up blood  NO  Chest congestion  NO  Chest pain  NO  Shortness of breath  NO  Profound Loss of smell/taste  NO  Nausea/Vomiting   NO  Loose stool/Diarrhea  NO  any skin issues    Patient Risk Factors Reviewed as follows:  NO  have you been in Close contact with confirmed COVID19 patient   NO  History of recent travel to affected geographical areas within the past 14 days  NO  COPD  NO  Active Cancer/Leukemia/Lymphoma/Chemotherapy  NO  Oral steroid use  NO  Pregnant  NO  Diabetes Mellitus  NO  Heart disease  NO  Asthma  NO Health care worker at home  [address] care worker  NO Is there a Pregnant Woman in the home  NO Dialysis pt in the home   NO a large number of people living in the home    Learning Assessment 3/21/2022   PRIMARY LEARNER Patient   HIGHEST LEVEL OF EDUCATION - PRIMARY LEARNER  -   BARRIERS PRIMARY LEARNER -   Judy Vargas LEVEL OF EDUCATION -   BARRIERS CO-LEARNER -   PRIMARY LANGUAGE ENGLISH   PRIMARY LANGUAGE CO-LEARNER -    NEED -   LEARNER PREFERENCE PRIMARY READING   LEARNER PREFERENCE CO-LEARNER -   LEARNING SPECIAL TOPICS -   ANSWERED BY patient   RELATIONSHIP SELF     Fall Risk Assessment, last 12 mths 6/13/2022   Able to walk? Yes   Fall in past 12 months? 0   Do you feel unsteady? 0   Are you worried about falling 0       3 most recent PHQ Screens 6/13/2022   Little interest or pleasure in doing things Nearly every day   Feeling down, depressed, irritable, or hopeless More than half the days   Total Score PHQ 2 5   Trouble falling or staying asleep, or sleeping too much Nearly every day   Feeling tired or having little energy More than half the days   Poor appetite, weight loss, or overeating Not at all   Feeling bad about yourself - or that you are a failure or have let yourself or your family down More than half the days   Trouble concentrating on things such as school, work, reading, or watching TV More than half the days   Moving or speaking so slowly that other people could have noticed; or the opposite being so fidgety that others notice Not at all   Thoughts of being better off dead, or hurting yourself in some way Not at all   PHQ 9 Score 14   How difficult have these problems made it for you to do your work, take care of your home and get along with others -   In the past year have you felt depressed or sad most days, even if you felt okay? -   Has there been a time in the past month when you have had serious thoughts about ending your life? -   Have you ever in your whole life, tried to kill yourself or made a suicide attempt? -     Abuse Screening Questionnaire 6/13/2022   Do you ever feel afraid of your partner? N   Are you in a relationship with someone who physically or mentally threatens you? N   Is it safe for you to go home?  Y       ADL Assessment 6/13/2022   Feeding yourself No Help Needed Getting from bed to chair No Help Needed   Getting dressed No Help Needed   Bathing or showering No Help Needed   Walk across the room (includes cane/walker) No Help Needed   Using the telphone No Help Needed   Taking your medications No Help Needed   Preparing meals No Help Needed   Managing money (expenses/bills) No Help Needed   Moderately strenuous housework (laundry) No Help Needed   Shopping for personal items (toiletries/medicines) No Help Needed   Shopping for groceries No Help Needed   Driving No Help Needed   Climbing a flight of stairs No Help Needed   Getting to places beyond walking distances No Help Needed      Advance Care Planning 6/13/2022   Patient's Healthcare Decision Maker is: Legal Next of Kin   Confirm Advance Directive None   Patient Would Like to Complete Advance Directive No   Some encounter information is confidential and restricted. Go to Review Flowsheets activity to see all data.

## 2022-06-15 DIAGNOSIS — F43.21 ADJUSTMENT DISORDER WITH DEPRESSED MOOD: ICD-10-CM

## 2022-07-05 ENCOUNTER — OFFICE VISIT (OUTPATIENT)
Dept: FAMILY MEDICINE CLINIC | Age: 22
End: 2022-07-05
Payer: MEDICAID

## 2022-07-05 VITALS
BODY MASS INDEX: 19.99 KG/M2 | HEIGHT: 69 IN | HEART RATE: 60 BPM | WEIGHT: 135 LBS | OXYGEN SATURATION: 98 % | DIASTOLIC BLOOD PRESSURE: 68 MMHG | SYSTOLIC BLOOD PRESSURE: 118 MMHG | TEMPERATURE: 98.4 F | RESPIRATION RATE: 19 BRPM

## 2022-07-05 DIAGNOSIS — F33.2 SEVERE EPISODE OF RECURRENT MAJOR DEPRESSIVE DISORDER, WITHOUT PSYCHOTIC FEATURES (HCC): ICD-10-CM

## 2022-07-05 DIAGNOSIS — M79.641 RIGHT HAND PAIN: Primary | ICD-10-CM

## 2022-07-05 PROCEDURE — 99214 OFFICE O/P EST MOD 30 MIN: CPT | Performed by: NURSE PRACTITIONER

## 2022-07-05 RX ORDER — SERTRALINE HYDROCHLORIDE 100 MG/1
100 TABLET, FILM COATED ORAL DAILY
Qty: 90 TABLET | Refills: 4 | Status: SHIPPED | OUTPATIENT
Start: 2022-07-05

## 2022-07-05 NOTE — PROGRESS NOTES
Andrew Diaz is a 22 y.o. male presenting for/with:    Chief Complaint   Patient presents with    Follow-up     3 MONTH F/U FOR ZOLOFT 50MG. pATIENT HE IS DOING BETTER       There were no vitals taken for this visit. Pain Scale: /10  Pain Location:     1. \"Have you been to the ER, urgent care clinic since your last visit? Hospitalized since your last visit? \" No    2. \"Have you seen or consulted any other health care providers outside of the [address] [place] [address] since your last visit? \" No     3. For patients aged 39-70: Has the patient had a colonoscopy / FIT/ Cologuard? NA - based on age      If the patient is female:    4. For patients aged 41-77: Has the patient had a mammogram within the past 2 years? NA - based on age or sex      11. For patients aged 21-65: Has the patient had a pap smear?  NA - based on age or sex      Symptom review:  NO  Fever   NO  Shaking chills  NO  Cough  NO  Body aches  NO  Coughing up blood  NO  Chest congestion  NO  Chest pain  NO  Shortness of breath  NO  Profound Loss of smell/taste  NO  Nausea/Vomiting   NO  Loose stool/Diarrhea  NO  any skin issues    Patient Risk Factors Reviewed as follows:  NO  have you been in Close contact with confirmed COVID19 patient   NO  History of recent travel to affected geographical areas within the past 14 days  NO  COPD  NO  Active Cancer/Leukemia/Lymphoma/Chemotherapy  NO  Oral steroid use  NO  Pregnant  NO  Diabetes Mellitus  NO  Heart disease  NO  Asthma  NO Health care worker at home  NO Health care worker  NO Is there a Pregnant Woman in the home  NO Dialysis pt in the home   NO a large number of people living in the home    Learning Assessment 7/5/2022   PRIMARY LEARNER Patient   HIGHEST LEVEL OF EDUCATION - PRIMARY LEARNER  -   BARRIERS PRIMARY LEARNER -   [address] -  NEED -   LEARNER PREFERENCE PRIMARY DEMONSTRATION   LEARNER PREFERENCE CO-LEARNER -   LEARNING SPECIAL TOPICS -   ANSWERED BY self   RELATIONSHIP SELF     Fall Risk Assessment, last 12 mths 7/5/2022   Able to walk? Yes   Fall in past 12 months? 0   Do you feel unsteady? 0   Are you worried about falling 0       3 most recent PHQ Screens 7/5/2022   Little interest or pleasure in doing things Several days   Feeling down, depressed, irritable, or hopeless Several days   Total Score PHQ 2 2   Trouble falling or staying asleep, or sleeping too much -   Feeling tired or having little energy -   Poor appetite, weight loss, or overeating -   Feeling bad about yourself - or that you are a failure or have let yourself or your family down -   Trouble concentrating on things such as school, work, reading, or watching TV -   Moving or speaking so slowly that other people could have noticed; or the opposite being so fidgety that others notice -   Thoughts of being better off dead, or hurting yourself in some way -   PHQ 9 Score -   How difficult have these problems made it for you to do your work, take care of your home and get along with others -   In the past year have you felt depressed or sad most days, even if you felt okay? -   Has there been a time in the past month when you have had serious thoughts about ending your life? -   Have you ever in your whole life, tried to kill yourself or made a suicide attempt? -     Abuse Screening Questionnaire 7/5/2022   Do you ever feel afraid of your partner? N   Are you in a relationship with someone who physically or mentally threatens you? N   Is it safe for you to go home?  Y       ADL Assessment 7/5/2022   Feeding yourself No Help Needed   Getting from bed to chair No Help Needed   Getting dressed No Help Needed   Bathing or showering No Help Needed   Walk across the room (includes cane/walker) No Help Needed   Using the telphone No Help Needed   Taking your medications No Help Needed   Preparing meals No Help Needed   Managing money (expenses/bills) No Help Needed   Moderately strenuous housework (laundry) No Help Needed   Shopping for personal items (toiletries/medicines) No Help Needed   Shopping for groceries No Help Needed   Driving No Help Needed   Climbing a flight of stairs No Help Needed   Getting to places beyond walking distances No Help Needed      Advance Care Planning 6/13/2022   Patient's Healthcare Decision Maker is: Legal Next of Kin   Confirm Advance Directive None   Patient Would Like to Complete Advance Directive No   Some encounter information is confidential and restricted. Go to Review Flowsheets activity to see all data.
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
## 2022-08-04 ENCOUNTER — HOSPITAL ENCOUNTER (OUTPATIENT)
Dept: GENERAL RADIOLOGY | Age: 22
Discharge: HOME | End: 2022-08-04
Payer: MEDICAID

## 2022-08-04 DIAGNOSIS — M79.641 RIGHT HAND PAIN: ICD-10-CM

## 2022-08-04 PROCEDURE — 73130 X-RAY EXAM OF HAND: CPT

## 2022-08-05 ENCOUNTER — TELEPHONE (OUTPATIENT)
Dept: FAMILY MEDICINE CLINIC | Age: 22
End: 2022-08-05

## 2022-10-07 ENCOUNTER — OFFICE VISIT (OUTPATIENT)
Dept: FAMILY MEDICINE CLINIC | Age: 22
End: 2022-10-07
Payer: MEDICAID

## 2022-10-07 VITALS
RESPIRATION RATE: 18 BRPM | HEIGHT: 69 IN | OXYGEN SATURATION: 97 % | HEART RATE: 77 BPM | DIASTOLIC BLOOD PRESSURE: 70 MMHG | TEMPERATURE: 98.4 F | SYSTOLIC BLOOD PRESSURE: 118 MMHG | WEIGHT: 133.8 LBS | BODY MASS INDEX: 19.82 KG/M2

## 2022-10-07 DIAGNOSIS — Z23 ENCOUNTER FOR IMMUNIZATION: ICD-10-CM

## 2022-10-07 DIAGNOSIS — F43.21 ADJUSTMENT DISORDER WITH DEPRESSED MOOD: Primary | ICD-10-CM

## 2022-10-07 PROCEDURE — 99213 OFFICE O/P EST LOW 20 MIN: CPT | Performed by: NURSE PRACTITIONER

## 2022-10-07 PROCEDURE — 90686 IIV4 VACC NO PRSV 0.5 ML IM: CPT | Performed by: NURSE PRACTITIONER
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
## 2022-10-07 NOTE — PROGRESS NOTES
Katharine Osei is a 22 y.o. male presenting for/with:    No chief complaint on file. There were no vitals taken for this visit. Pain Scale: /10  Pain Location:     1. \"Have you been to the ER, urgent care clinic since your last visit? Hospitalized since your last visit? \" No    2. \"Have you seen or consulted any other health care providers outside of the [address] [place] [address] since your last visit? \" No     3. For patients aged 39-70: Has the patient had a colonoscopy / FIT/ Cologuard? NA - based on age      If the patient is female:    4. For patients aged 41-77: Has the patient had a mammogram within the past 2 years? NA - based on age or sex      11. For patients aged 21-65: Has the patient had a pap smear? NA - based on age or sex          Patient    Learning Assessment 7/5/2022   PRIMARY LEARNER Patient   HIGHEST LEVEL OF EDUCATION - PRIMARY LEARNER  -   BARRIERS PRIMARY LEARNER -   Judy 88 LEVEL OF EDUCATION -   Radha Busby 10 -   PRIMARY LANGUAGE ENGLISH   PRIMARY LANGUAGE CO-LEARNER -    NEED -   LEARNER PREFERENCE PRIMARY DEMONSTRATION   LEARNER PREFERENCE CO-LEARNER -   LEARNING SPECIAL TOPICS -   ANSWERED BY self   RELATIONSHIP SELF     Fall Risk Assessment, last 12 mths 7/5/2022   Able to walk? Yes   Fall in past 12 months? 0   Do you feel unsteady?  0   Are you worried about falling 0       3 most recent PHQ Screens 10/7/2022   Little interest or pleasure in doing things Not at all   Feeling down, depressed, irritable, or hopeless Not at all   Total Score PHQ 2 0   Trouble falling or staying asleep, or sleeping too much -   Feeling tired or having little energy -   Poor appetite, weight loss, or overeating -   Feeling bad about yourself - or that you are a failure or have let yourself or your family down -   Trouble concentrating on things such as school, work, reading, or watching TV -   Moving or speaking so slowly that other people could have noticed; or the opposite being so fidgety that others notice -   Thoughts of being better off dead, or hurting yourself in some way -   PHQ 9 Score -   How difficult have these problems made it for you to do your work, take care of your home and get along with others -   In the past year have you felt depressed or sad most days, even if you felt okay? -   Has there been a time in the past month when you have had serious thoughts about ending your life? -   Have you ever in your whole life, tried to kill yourself or made a suicide attempt? -     Abuse Screening Questionnaire 10/7/2022   Do you ever feel afraid of your partner? N   Are you in a relationship with someone who physically or mentally threatens you? N   Is it safe for you to go home? Y       ADL Assessment 10/7/2022   Feeding yourself No Help Needed   Getting from bed to chair No Help Needed   Getting dressed No Help Needed   Bathing or showering No Help Needed   Walk across the room (includes cane/walker) No Help Needed   Using the telphone No Help Needed   Taking your medications No Help Needed   Preparing meals No Help Needed   Managing money (expenses/bills) No Help Needed   Moderately strenuous housework (laundry) No Help Needed   Shopping for personal items (toiletries/medicines) No Help Needed   Shopping for groceries No Help Needed   Driving -   Climbing a flight of stairs No Help Needed   Getting to places beyond walking distances No Help Needed      Advance Care Planning 6/13/2022   Patient's Healthcare Decision Maker is: Legal Next of Kin   Confirm Advance Directive None   Patient Would Like to Complete Advance Directive No   Some encounter information is confidential and restricted. Go to Review Flowsheets activity to see all data.

## 2022-11-21 ENCOUNTER — TELEPHONE (OUTPATIENT)
Dept: FAMILY MEDICINE CLINIC | Age: 22
End: 2022-11-21

## 2022-11-21 NOTE — TELEPHONE ENCOUNTER
Patient called to mitch Morel know he has not taken his Sertraline, hydroxyzine and risperidone for the past 2 months and states he is feeling fine and does not want to go back on theses at this time. Dominic Castillo

## 2023-01-03 ENCOUNTER — OFFICE VISIT (OUTPATIENT)
Dept: FAMILY MEDICINE CLINIC | Age: 23
End: 2023-01-03
Payer: COMMERCIAL

## 2023-01-03 VITALS
SYSTOLIC BLOOD PRESSURE: 110 MMHG | TEMPERATURE: 98.7 F | HEIGHT: 69 IN | HEART RATE: 78 BPM | WEIGHT: 134.4 LBS | RESPIRATION RATE: 18 BRPM | OXYGEN SATURATION: 97 % | DIASTOLIC BLOOD PRESSURE: 70 MMHG | BODY MASS INDEX: 19.91 KG/M2

## 2023-01-03 DIAGNOSIS — R05.9 COUGH, UNSPECIFIED TYPE: ICD-10-CM

## 2023-01-03 DIAGNOSIS — Z13.1 SCREENING FOR DIABETES MELLITUS: ICD-10-CM

## 2023-01-03 DIAGNOSIS — J02.9 SORE THROAT: ICD-10-CM

## 2023-01-03 DIAGNOSIS — Z13.220 SCREENING, LIPID: Primary | ICD-10-CM

## 2023-01-03 DIAGNOSIS — F43.21 ADJUSTMENT DISORDER WITH DEPRESSED MOOD: ICD-10-CM

## 2023-01-03 LAB
EXP DATE SOLUTION: NORMAL
EXP DATE SWAB: NORMAL
LOT NUMBER SOLUTION: NORMAL
LOT NUMBER SWAB: NORMAL
S PYO AG THROAT QL: NEGATIVE
SARS-COV-2 RNA POC: NEGATIVE
VALID INTERNAL CONTROL?: YES

## 2023-01-03 PROCEDURE — 99213 OFFICE O/P EST LOW 20 MIN: CPT | Performed by: NURSE PRACTITIONER

## 2023-01-03 PROCEDURE — 87635 SARS-COV-2 COVID-19 AMP PRB: CPT | Performed by: NURSE PRACTITIONER

## 2023-01-03 PROCEDURE — 87880 STREP A ASSAY W/OPTIC: CPT | Performed by: NURSE PRACTITIONER

## 2023-01-03 NOTE — PROGRESS NOTES
Richa Mathur is a 25 y.o. male presenting for/with:    Chief Complaint   Patient presents with    Sore Throat     Sore throat x 4 days     Nasal Congestion    Cough     Has developed cough with green mucus production        Visit Vitals  /70 (BP 1 Location: Right arm, BP Patient Position: Sitting)   Pulse 78   Temp 98.7 °F (37.1 °C) (Temporal)   Resp 18   Ht 5' 9\" (1.753 m)   Wt 134 lb 6.4 oz (61 kg)   SpO2 97%   BMI 19.85 kg/m²     Pain Scale: 0 - No pain/10  Pain Location:     1. \"Have you been to the ER, urgent care clinic since your last visit? Hospitalized since your last visit? \" No    2. \"Have you seen or consulted any other health care providers outside of the [address] [place] [address] since your last visit? \" No     3. For patients aged 39-70: Has the patient had a colonoscopy / FIT/ Cologuard? NA - based on age      If the patient is female:    4. For patients aged 41-77: Has the patient had a mammogram within the past 2 years? NA - based on age or sex      11. For patients aged 21-65: Has the patient had a pap smear? NA - based on age or sex          Patient    Learning Assessment 7/5/2022   PRIMARY LEARNER Patient   HIGHEST LEVEL OF EDUCATION - PRIMARY LEARNER  -   BARRIERS PRIMARY LEARNER -   Judy 88 LEVEL OF EDUCATION -   Radha Busby 10 -   PRIMARY LANGUAGE ENGLISH   PRIMARY LANGUAGE CO-LEARNER -    NEED -   LEARNER PREFERENCE PRIMARY DEMONSTRATION   LEARNER PREFERENCE CO-LEARNER -   LEARNING SPECIAL TOPICS -   ANSWERED BY self   RELATIONSHIP SELF     Fall Risk Assessment, last 12 mths 7/5/2022   Able to walk? Yes   Fall in past 12 months? 0   Do you feel unsteady?  0   Are you worried about falling 0       3 most recent PHQ Screens 1/3/2023   Little interest or pleasure in doing things Not at all   Feeling down, depressed, irritable, or hopeless Not at all   Total Score PHQ 2 0   Trouble falling or staying asleep, or sleeping too much -   Feeling tired or having little energy -   Poor appetite, weight loss, or overeating -   Feeling bad about yourself - or that you are a failure or have let yourself or your family down -   Trouble concentrating on things such as school, work, reading, or watching TV -   Moving or speaking so slowly that other people could have noticed; or the opposite being so fidgety that others notice -   Thoughts of being better off dead, or hurting yourself in some way -   PHQ 9 Score -   How difficult have these problems made it for you to do your work, take care of your home and get along with others -   In the past year have you felt depressed or sad most days, even if you felt okay? -   Has there been a time in the past month when you have had serious thoughts about ending your life? -   Have you ever in your whole life, tried to kill yourself or made a suicide attempt? -     Abuse Screening Questionnaire 10/7/2022   Do you ever feel afraid of your partner? N   Are you in a relationship with someone who physically or mentally threatens you? N   Is it safe for you to go home?  Y       ADL Assessment 10/7/2022   Feeding yourself No Help Needed   Getting from bed to chair No Help Needed   Getting dressed No Help Needed   Bathing or showering No Help Needed   Walk across the room (includes cane/walker) No Help Needed   Using the telphone No Help Needed   Taking your medications No Help Needed   Preparing meals No Help Needed   Managing money (expenses/bills) No Help Needed   Moderately strenuous housework (laundry) No Help Needed   Shopping for personal items (toiletries/medicines) No Help Needed   Shopping for groceries No Help Needed   Driving -   Climbing a flight of stairs No Help Needed   Getting to places beyond walking distances No Help Needed      Advance Care Planning 6/13/2022   Patient's Healthcare Decision Maker is: Legal Next of Kin   Confirm Advance Directive None   Patient Would Like to Complete Advance Directive No   Some encounter information is confidential and restricted. Go to Review Flowsheets activity to see all data.
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
## 2023-01-04 LAB
25(OH)D3 SERPL-MCNC: 21.1 NG/ML (ref 30–100)
ALBUMIN SERPL-MCNC: 3.9 G/DL (ref 3.5–5)
ALBUMIN/GLOB SERPL: 1.4 {RATIO} (ref 1.1–2.2)
ALP SERPL-CCNC: 55 U/L (ref 45–117)
ALT SERPL-CCNC: 20 U/L (ref 12–78)
ANION GAP SERPL CALC-SCNC: 5 MMOL/L (ref 5–15)
AST SERPL-CCNC: 21 U/L (ref 15–37)
BASOPHILS # BLD: 0 K/UL (ref 0–0.1)
BASOPHILS NFR BLD: 1 % (ref 0–1)
BILIRUB SERPL-MCNC: 0.5 MG/DL (ref 0.2–1)
BUN SERPL-MCNC: 12 MG/DL (ref 6–20)
BUN/CREAT SERPL: 12 (ref 12–20)
CALCIUM SERPL-MCNC: 9.4 MG/DL (ref 8.5–10.1)
CHLORIDE SERPL-SCNC: 107 MMOL/L (ref 97–108)
CHOLEST SERPL-MCNC: 143 MG/DL
CO2 SERPL-SCNC: 28 MMOL/L (ref 21–32)
CREAT SERPL-MCNC: 1.01 MG/DL (ref 0.7–1.3)
DIFFERENTIAL METHOD BLD: NORMAL
EOSINOPHIL # BLD: 0.3 K/UL (ref 0–0.4)
EOSINOPHIL NFR BLD: 4 % (ref 0–7)
ERYTHROCYTE [DISTWIDTH] IN BLOOD BY AUTOMATED COUNT: 13.2 % (ref 11.5–14.5)
EST. AVERAGE GLUCOSE BLD GHB EST-MCNC: 103 MG/DL
GLOBULIN SER CALC-MCNC: 2.7 G/DL (ref 2–4)
GLUCOSE SERPL-MCNC: 82 MG/DL (ref 65–100)
HBA1C MFR BLD: 5.2 % (ref 4–5.6)
HCT VFR BLD AUTO: 37.7 % (ref 36.6–50.3)
HDLC SERPL-MCNC: 65 MG/DL
HDLC SERPL: 2.2 {RATIO} (ref 0–5)
HGB BLD-MCNC: 12.5 G/DL (ref 12.1–17)
IMM GRANULOCYTES # BLD AUTO: 0 K/UL (ref 0–0.04)
IMM GRANULOCYTES NFR BLD AUTO: 0 % (ref 0–0.5)
LDLC SERPL CALC-MCNC: 70.4 MG/DL (ref 0–100)
LYMPHOCYTES # BLD: 2.1 K/UL (ref 0.8–3.5)
LYMPHOCYTES NFR BLD: 24 % (ref 12–49)
MCH RBC QN AUTO: 29.7 PG (ref 26–34)
MCHC RBC AUTO-ENTMCNC: 33.2 G/DL (ref 30–36.5)
MCV RBC AUTO: 89.5 FL (ref 80–99)
MONOCYTES # BLD: 0.7 K/UL (ref 0–1)
MONOCYTES NFR BLD: 8 % (ref 5–13)
NEUTS SEG # BLD: 5.5 K/UL (ref 1.8–8)
NEUTS SEG NFR BLD: 63 % (ref 32–75)
NRBC # BLD: 0 K/UL (ref 0–0.01)
NRBC BLD-RTO: 0 PER 100 WBC
PLATELET # BLD AUTO: 196 K/UL (ref 150–400)
PMV BLD AUTO: 11.5 FL (ref 8.9–12.9)
POTASSIUM SERPL-SCNC: 3.8 MMOL/L (ref 3.5–5.1)
PROT SERPL-MCNC: 6.6 G/DL (ref 6.4–8.2)
RBC # BLD AUTO: 4.21 M/UL (ref 4.1–5.7)
SODIUM SERPL-SCNC: 140 MMOL/L (ref 136–145)
T4 FREE SERPL-MCNC: 1.1 NG/DL (ref 0.8–1.5)
TRIGL SERPL-MCNC: 38 MG/DL (ref ?–150)
TSH SERPL DL<=0.05 MIU/L-ACNC: 0.56 UIU/ML (ref 0.36–3.74)
VIT B12 SERPL-MCNC: 434 PG/ML (ref 193–986)
VLDLC SERPL CALC-MCNC: 7.6 MG/DL
WBC # BLD AUTO: 8.7 K/UL (ref 4.1–11.1)
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
## 2023-04-21 ENCOUNTER — OFFICE VISIT (OUTPATIENT)
Dept: FAMILY MEDICINE CLINIC | Age: 23
End: 2023-04-21
Payer: COMMERCIAL

## 2023-04-21 VITALS
TEMPERATURE: 97.8 F | HEART RATE: 78 BPM | RESPIRATION RATE: 16 BRPM | HEIGHT: 69 IN | SYSTOLIC BLOOD PRESSURE: 112 MMHG | DIASTOLIC BLOOD PRESSURE: 60 MMHG | WEIGHT: 127 LBS | OXYGEN SATURATION: 98 % | BODY MASS INDEX: 18.81 KG/M2

## 2023-04-21 DIAGNOSIS — Z86.59 HISTORY OF DEPRESSION: ICD-10-CM

## 2023-04-21 DIAGNOSIS — F51.01 PRIMARY INSOMNIA: Primary | ICD-10-CM

## 2023-04-21 DIAGNOSIS — R14.0 ABDOMINAL BLOATING: ICD-10-CM

## 2023-04-21 PROCEDURE — 99213 OFFICE O/P EST LOW 20 MIN: CPT | Performed by: NURSE PRACTITIONER

## 2023-04-21 NOTE — PROGRESS NOTES
Keila Hannon is a 25 y.o. male presenting for/with:    Chief Complaint   Patient presents with    Bloated     C/o feeling bloated x 3-4 days . Aneta Gamez appetite is normal .. denies any abdominal pain or nausea . .. no acid reflux symptoms    Depression     6 month f/up . . not taking any medications currently     Fatigue     States he has not been sleeping well lately and having trouble falling asleep some nights       Visit Vitals  /60 (BP 1 Location: Left arm, BP Patient Position: Sitting)   Pulse 78   Temp 97.8 °F (36.6 °C) (Temporal)   Resp 16   Ht 5' 9\" (1.753 m)   Wt 127 lb (57.6 kg)   SpO2 98%   BMI 18.75 kg/m²     Pain Scale: 0 - No pain/10  Pain Location:     1. \"Have you been to the ER, urgent care clinic since your last visit? Hospitalized since your last visit? \" No    2. \"Have you seen or consulted any other health care providers outside of the [address] [place] [address] since your last visit? \" No     3. For patients aged 39-70: Has the patient had a colonoscopy / FIT/ Cologuard? NA - based on age      If the patient is female:    4. For patients aged 41-77: Has the patient had a mammogram within the past 2 years? NA - based on age or sex      11. For patients aged 21-65: Has the patient had a pap smear? NA - based on age or sex          Patient    Learning Assessment 7/5/2022   PRIMARY LEARNER Patient   HIGHEST LEVEL OF EDUCATION - PRIMARY LEARNER  -   BARRIERS PRIMARY LEARNER -   Judy  LEVEL OF EDUCATION -   Radha Busby 10 -   PRIMARY LANGUAGE ENGLISH   PRIMARY LANGUAGE CO-LEARNER -    NEED -   LEARNER PREFERENCE PRIMARY DEMONSTRATION   LEARNER PREFERENCE CO-LEARNER -   LEARNING SPECIAL TOPICS -   ANSWERED BY self   RELATIONSHIP SELF     Fall Risk Assessment, last 12 mths 7/5/2022   Able to walk? Yes   Fall in past 12 months? 0   Do you feel unsteady?  0   Are you worried about falling 0       3 most recent PHQ Screens 4/21/2023   Little interest or pleasure in doing things Not at all   Feeling down, depressed, irritable, or hopeless Not at all   Total Score PHQ 2 0   Trouble falling or staying asleep, or sleeping too much -   Feeling tired or having little energy -   Poor appetite, weight loss, or overeating -   Feeling bad about yourself - or that you are a failure or have let yourself or your family down -   Trouble concentrating on things such as school, work, reading, or watching TV -   Moving or speaking so slowly that other people could have noticed; or the opposite being so fidgety that others notice -   Thoughts of being better off dead, or hurting yourself in some way -   PHQ 9 Score -   How difficult have these problems made it for you to do your work, take care of your home and get along with others -   In the past year have you felt depressed or sad most days, even if you felt okay? -   Has there been a time in the past month when you have had serious thoughts about ending your life? -   Have you ever in your whole life, tried to kill yourself or made a suicide attempt? -     Abuse Screening Questionnaire 10/7/2022   Do you ever feel afraid of your partner? N   Are you in a relationship with someone who physically or mentally threatens you? N   Is it safe for you to go home?  Y       ADL Assessment 10/7/2022   Feeding yourself No Help Needed   Getting from bed to chair No Help Needed   Getting dressed No Help Needed   Bathing or showering No Help Needed   Walk across the room (includes cane/walker) No Help Needed   Using the telphone No Help Needed   Taking your medications No Help Needed   Preparing meals No Help Needed   Managing money (expenses/bills) No Help Needed   Moderately strenuous housework (laundry) No Help Needed   Shopping for personal items (toiletries/medicines) No Help Needed   Shopping for groceries No Help Needed   Driving -   Climbing a flight of stairs No Help Needed   Getting to places beyond walking distances No Help Needed      Advance Care Planning 6/13/2022   Patient's Healthcare Decision Maker is: Legal Next of Kin   Confirm Advance Directive None   Patient Would Like to Complete Advance Directive No   Some encounter information is confidential and restricted. Go to Review Flowsheets activity to see all data.

## 2023-08-02 ENCOUNTER — TELEPHONE (OUTPATIENT)
Age: 23
End: 2023-08-02

## 2023-08-02 NOTE — TELEPHONE ENCOUNTER
----- Message from Shiraz Cisneros sent at 8/2/2023 12:36 PM EDT -----  Subject: Message to Provider    QUESTIONS  Information for Provider? Pt would like you to contact his  in   regards to scheduling an appointment. April Twist is the name of the case   worker. ---------------------------------------------------------------------------  --------------  Crescencio Luna INFO  [phone]; OK to leave message on voicemail  ---------------------------------------------------------------------------  --------------  SCRIPT ANSWERS  Relationship to Patient?  Self
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
## 2023-08-25 ENCOUNTER — OFFICE VISIT (OUTPATIENT)
Age: 23
End: 2023-08-25

## 2023-08-25 VITALS
HEIGHT: 69 IN | WEIGHT: 131.6 LBS | DIASTOLIC BLOOD PRESSURE: 70 MMHG | TEMPERATURE: 97.5 F | HEART RATE: 75 BPM | SYSTOLIC BLOOD PRESSURE: 118 MMHG | BODY MASS INDEX: 19.49 KG/M2 | OXYGEN SATURATION: 98 % | RESPIRATION RATE: 18 BRPM

## 2023-08-25 DIAGNOSIS — Z86.59 HISTORY OF DEPRESSION: Primary | ICD-10-CM

## 2023-08-25 SDOH — ECONOMIC STABILITY: INCOME INSECURITY: HOW HARD IS IT FOR YOU TO PAY FOR THE VERY BASICS LIKE FOOD, HOUSING, MEDICAL CARE, AND HEATING?: NOT HARD AT ALL

## 2023-08-25 SDOH — ECONOMIC STABILITY: FOOD INSECURITY: WITHIN THE PAST 12 MONTHS, THE FOOD YOU BOUGHT JUST DIDN'T LAST AND YOU DIDN'T HAVE MONEY TO GET MORE.: NEVER TRUE

## 2023-08-25 SDOH — ECONOMIC STABILITY: HOUSING INSECURITY
IN THE LAST 12 MONTHS, WAS THERE A TIME WHEN YOU DID NOT HAVE A STEADY PLACE TO SLEEP OR SLEPT IN A SHELTER (INCLUDING NOW)?: NO

## 2023-08-25 SDOH — ECONOMIC STABILITY: FOOD INSECURITY: WITHIN THE PAST 12 MONTHS, YOU WORRIED THAT YOUR FOOD WOULD RUN OUT BEFORE YOU GOT MONEY TO BUY MORE.: NEVER TRUE

## 2023-08-25 ASSESSMENT — PATIENT HEALTH QUESTIONNAIRE - PHQ9
6. FEELING BAD ABOUT YOURSELF - OR THAT YOU ARE A FAILURE OR HAVE LET YOURSELF OR YOUR FAMILY DOWN: 0
SUM OF ALL RESPONSES TO PHQ QUESTIONS 1-9: 0
9. THOUGHTS THAT YOU WOULD BE BETTER OFF DEAD, OR OF HURTING YOURSELF: 0
SUM OF ALL RESPONSES TO PHQ QUESTIONS 1-9: 0
SUM OF ALL RESPONSES TO PHQ QUESTIONS 1-9: 0
4. FEELING TIRED OR HAVING LITTLE ENERGY: 0
3. TROUBLE FALLING OR STAYING ASLEEP: 0
SUM OF ALL RESPONSES TO PHQ QUESTIONS 1-9: 0
5. POOR APPETITE OR OVEREATING: 0
SUM OF ALL RESPONSES TO PHQ QUESTIONS 1-9: 0
SUM OF ALL RESPONSES TO PHQ QUESTIONS 1-9: 0
1. LITTLE INTEREST OR PLEASURE IN DOING THINGS: 0
7. TROUBLE CONCENTRATING ON THINGS, SUCH AS READING THE NEWSPAPER OR WATCHING TELEVISION: 0
8. MOVING OR SPEAKING SO SLOWLY THAT OTHER PEOPLE COULD HAVE NOTICED. OR THE OPPOSITE, BEING SO FIGETY OR RESTLESS THAT YOU HAVE BEEN MOVING AROUND A LOT MORE THAN USUAL: 0
2. FEELING DOWN, DEPRESSED OR HOPELESS: 0
SUM OF ALL RESPONSES TO PHQ QUESTIONS 1-9: 0
SUM OF ALL RESPONSES TO PHQ9 QUESTIONS 1 & 2: 0
1. LITTLE INTEREST OR PLEASURE IN DOING THINGS: 0
SUM OF ALL RESPONSES TO PHQ QUESTIONS 1-9: 0
SUM OF ALL RESPONSES TO PHQ9 QUESTIONS 1 & 2: 0
2. FEELING DOWN, DEPRESSED OR HOPELESS: 0

## 2023-09-12 ENCOUNTER — TELEPHONE (OUTPATIENT)
Age: 23
End: 2023-09-12

## 2023-09-12 NOTE — TELEPHONE ENCOUNTER
Records sent to . Request on file.         ----- Message from Casey Heard sent at 9/12/2023 10:02 AM EDT -----  Subject: Message to Provider    QUESTIONS  Information for Provider? April stated that she faxed over a WILFREDO and a   request for the after visit summary on 8/28 and 9/1 and has not received   it back. April would like that filled out and faxed back to her at   [phone] and she can be reached at [phone]. April would like a   call once the forms have been completed and faxed back to her.   ---------------------------------------------------------------------------  --------------  [address] [place] [address] [place]  [phone]; OK to leave message on voicemail  ---------------------------------------------------------------------------  --------------  SCRIPT ANSWERS  Relationship to Patient? Covered Entity  Covered Entity Type? Other  Other Covered Entity Type?    Representative Name?  April Twist

## 2023-10-17 ENCOUNTER — TELEPHONE (OUTPATIENT)
Age: 23
End: 2023-10-17

## 2023-10-17 NOTE — TELEPHONE ENCOUNTER
----- Message from Zaid Pichardo sent at 10/17/2023  9:38 AM EDT -----  Subject: Message to Provider    QUESTIONS  Information for Provider? Pt  called to see if Dr will take   over pt medications. Pt will be getting discharged from mental health   facility and they would like to know if Dr will take over meds due to   getting discharged and not meeting criteria for staying there. Pt was   given prescription vistiril. Pt phone is broken call  at   [phone]  ---------------------------------------------------------------------------  --------------  [address] [place]  [phone]; OK to leave message on voicemail  ---------------------------------------------------------------------------  --------------  SCRIPT ANSWERS  Relationship to Patient? Covered Entity  Covered Entity Type? Physician Office? Representative Name?  April

## 2024-07-30 ENCOUNTER — HOSPITAL ENCOUNTER (EMERGENCY)
Facility: HOSPITAL | Age: 24
Discharge: HOME | End: 2024-07-31
Attending: FAMILY MEDICINE | Admitting: FAMILY MEDICINE
Payer: COMMERCIAL

## 2024-07-30 VITALS
DIASTOLIC BLOOD PRESSURE: 76 MMHG | SYSTOLIC BLOOD PRESSURE: 129 MMHG | RESPIRATION RATE: 16 BRPM | BODY MASS INDEX: 21.48 KG/M2 | WEIGHT: 145 LBS | HEART RATE: 66 BPM | TEMPERATURE: 98.4 F | OXYGEN SATURATION: 98 % | HEIGHT: 69 IN

## 2024-07-30 DIAGNOSIS — K02.9 PAIN DUE TO DENTAL CARIES: Primary | ICD-10-CM

## 2024-07-30 PROCEDURE — 99283 EMERGENCY DEPT VISIT LOW MDM: CPT

## 2024-07-30 PROCEDURE — 6370000000 HC RX 637 (ALT 250 FOR IP): Performed by: FAMILY MEDICINE

## 2024-07-30 RX ORDER — AMOXICILLIN 250 MG/1
500 CAPSULE ORAL ONCE
Status: COMPLETED | OUTPATIENT
Start: 2024-07-30 | End: 2024-07-30

## 2024-07-30 RX ADMIN — AMOXICILLIN 500 MG: 250 CAPSULE ORAL at 23:57

## 2024-07-30 RX ADMIN — BENZOCAINE, BUTAMBEN, AND TETRACAINE HYDROCHLORIDE: .028; .004; .004 AEROSOL, SPRAY TOPICAL at 23:57

## 2024-07-30 ASSESSMENT — PAIN DESCRIPTION - ORIENTATION: ORIENTATION: RIGHT;LOWER

## 2024-07-30 ASSESSMENT — PAIN - FUNCTIONAL ASSESSMENT: PAIN_FUNCTIONAL_ASSESSMENT: 0-10

## 2024-07-30 ASSESSMENT — LIFESTYLE VARIABLES
HOW OFTEN DO YOU HAVE A DRINK CONTAINING ALCOHOL: MONTHLY OR LESS
HOW MANY STANDARD DRINKS CONTAINING ALCOHOL DO YOU HAVE ON A TYPICAL DAY: 1 OR 2

## 2024-07-30 ASSESSMENT — PAIN SCALES - GENERAL: PAINLEVEL_OUTOF10: 9

## 2024-07-30 ASSESSMENT — PAIN DESCRIPTION - DESCRIPTORS: DESCRIPTORS: ACHING

## 2024-07-30 ASSESSMENT — PAIN DESCRIPTION - LOCATION: LOCATION: TEETH

## 2024-07-31 RX ORDER — AMOXICILLIN 500 MG/1
500 CAPSULE ORAL 3 TIMES DAILY
Qty: 21 CAPSULE | Refills: 0 | Status: SHIPPED | OUTPATIENT
Start: 2024-07-31 | End: 2024-08-07

## 2024-07-31 ASSESSMENT — PAIN - FUNCTIONAL ASSESSMENT: PAIN_FUNCTIONAL_ASSESSMENT: 0-10

## 2024-07-31 ASSESSMENT — PAIN SCALES - GENERAL: PAINLEVEL_OUTOF10: 4

## 2024-07-31 NOTE — ED TRIAGE NOTES
Patient came in with a c/o tooth pain. Patient states that it has been going on for two weeks. He has an appointment with the dentist for August 16, but needs something to help with the pain until then. He took some tylenol at 2200 today that did not give any relief.

## 2024-07-31 NOTE — DISCHARGE INSTRUCTIONS
--Ibuprofen 600 mg every 6 hours as needed for pain. Take with food.  --Tylenol 1000 mg every 6 hours as needed for pain.  --Amoxicillin 500 mg 3 time daily for 7 days.  --Dental balls: 1 tab every 3 hours as needed for pain.
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
## 2025-07-17 ENCOUNTER — OFFICE VISIT (OUTPATIENT)
Age: 25
End: 2025-07-17

## 2025-07-17 VITALS
RESPIRATION RATE: 18 BRPM | DIASTOLIC BLOOD PRESSURE: 68 MMHG | HEIGHT: 69 IN | HEART RATE: 73 BPM | SYSTOLIC BLOOD PRESSURE: 132 MMHG | OXYGEN SATURATION: 96 % | BODY MASS INDEX: 19.73 KG/M2 | TEMPERATURE: 97.2 F | WEIGHT: 133.2 LBS

## 2025-07-17 DIAGNOSIS — Z02.89 ENCOUNTER FOR PHYSICAL EXAMINATION RELATED TO EMPLOYMENT: Primary | ICD-10-CM

## 2025-07-17 PROCEDURE — 99173 VISUAL ACUITY SCREEN: CPT | Performed by: NURSE PRACTITIONER

## 2025-07-17 SDOH — ECONOMIC STABILITY: FOOD INSECURITY: WITHIN THE PAST 12 MONTHS, YOU WORRIED THAT YOUR FOOD WOULD RUN OUT BEFORE YOU GOT MONEY TO BUY MORE.: NEVER TRUE

## 2025-07-17 SDOH — ECONOMIC STABILITY: FOOD INSECURITY: WITHIN THE PAST 12 MONTHS, THE FOOD YOU BOUGHT JUST DIDN'T LAST AND YOU DIDN'T HAVE MONEY TO GET MORE.: NEVER TRUE

## 2025-07-17 ASSESSMENT — PATIENT HEALTH QUESTIONNAIRE - PHQ9
2. FEELING DOWN, DEPRESSED OR HOPELESS: NOT AT ALL
SUM OF ALL RESPONSES TO PHQ QUESTIONS 1-9: 0
1. LITTLE INTEREST OR PLEASURE IN DOING THINGS: NOT AT ALL
SUM OF ALL RESPONSES TO PHQ QUESTIONS 1-9: 0
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
## (undated) DEVICE — STERILE POLYISOPRENE POWDER-FREE SURGICAL GLOVES: Brand: PROTEXIS

## (undated) DEVICE — (D)PREP SKN CHLRAPRP APPL 26ML -- CONVERT TO ITEM 371833

## (undated) DEVICE — SPONGE GZ W4XL4IN COT 12 PLY TYP VII WVN C FLD DSGN

## (undated) DEVICE — STRAP POS KNEE BODY VELC

## (undated) DEVICE — ARTHROSCOPY RICHMOND-LF: Brand: MEDLINE INDUSTRIES, INC.

## (undated) DEVICE — SUTURE MCRYL SZ 4-0 L27IN ABSRB UD L19MM PS-2 1/2 CIR PRIM Y426H

## (undated) DEVICE — DYONICS 25 INFLOW/OUTFLOW TUBE                                    SET, SINGLE SUCTION, 3 PER BOX

## (undated) DEVICE — 4-PORT MANIFOLD: Brand: NEPTUNE 2

## (undated) DEVICE — INFECTION CONTROL KIT SYS

## (undated) DEVICE — SUT ETHLN 3-0 18IN PS1 BLK --

## (undated) DEVICE — SUTURE VCRL SZ 2-0 L36IN ABSRB UD L36MM CT-1 1/2 CIR J945H

## (undated) DEVICE — REM POLYHESIVE ADULT PATIENT RETURN ELECTRODE: Brand: VALLEYLAB

## (undated) DEVICE — SUTURE VCRL SZ 2-0 L27IN ABSRB UD L26MM SH 1/2 CIR J417H

## (undated) DEVICE — DRAPE,EXTREMITY,89X128,STERILE: Brand: MEDLINE

## (undated) DEVICE — GOWN,SIRUS,NONRNF,SETINSLV,2XL,18/CS: Brand: MEDLINE

## (undated) DEVICE — ROCKER SWITCH PENCIL BLADE ELECTRODE, HOLSTER: Brand: EDGE

## (undated) DEVICE — SOLUTION IRRIG 3000ML LAC R FLX CONT